# Patient Record
Sex: FEMALE | Race: WHITE | NOT HISPANIC OR LATINO | Employment: FULL TIME | URBAN - METROPOLITAN AREA
[De-identification: names, ages, dates, MRNs, and addresses within clinical notes are randomized per-mention and may not be internally consistent; named-entity substitution may affect disease eponyms.]

---

## 2021-11-16 ENCOUNTER — TELEPHONE (OUTPATIENT)
Dept: SCHEDULING | Facility: IMAGING CENTER | Age: 26
End: 2021-11-16

## 2021-11-17 ENCOUNTER — OFFICE VISIT (OUTPATIENT)
Dept: MEDICAL GROUP | Facility: CLINIC | Age: 26
End: 2021-11-17
Payer: COMMERCIAL

## 2021-11-17 VITALS
SYSTOLIC BLOOD PRESSURE: 110 MMHG | WEIGHT: 153 LBS | TEMPERATURE: 97.1 F | HEIGHT: 62 IN | OXYGEN SATURATION: 98 % | HEART RATE: 91 BPM | RESPIRATION RATE: 16 BRPM | DIASTOLIC BLOOD PRESSURE: 70 MMHG | BODY MASS INDEX: 28.16 KG/M2

## 2021-11-17 DIAGNOSIS — Z86.2 HISTORY OF ANEMIA: ICD-10-CM

## 2021-11-17 DIAGNOSIS — E55.9 VITAMIN D DEFICIENCY: ICD-10-CM

## 2021-11-17 DIAGNOSIS — Z96.41 PRESENCE OF INSULIN PUMP: ICD-10-CM

## 2021-11-17 DIAGNOSIS — E10.3293 TYPE 1 DIABETES MELLITUS WITH MILD NONPROLIFERATIVE DIABETIC RETINOPATHY WITHOUT MACULAR EDEMA, BILATERAL (HCC): ICD-10-CM

## 2021-11-17 PROBLEM — F33.9 MAJOR DEPRESSIVE DISORDER, RECURRENT EPISODE (HCC): Status: ACTIVE | Noted: 2020-03-25

## 2021-11-17 PROBLEM — G43.909 MIGRAINE: Status: ACTIVE | Noted: 2019-09-22

## 2021-11-17 PROBLEM — M79.89 SWELLING OF BOTH LOWER EXTREMITIES: Status: RESOLVED | Noted: 2019-12-22 | Resolved: 2021-11-17

## 2021-11-17 PROBLEM — N39.0 RECURRENT UTI: Status: ACTIVE | Noted: 2017-07-26

## 2021-11-17 PROBLEM — E86.0 DEHYDRATION: Status: ACTIVE | Noted: 2021-02-12

## 2021-11-17 PROBLEM — N13.30 HYDRONEPHROSIS: Status: ACTIVE | Noted: 2017-09-15

## 2021-11-17 PROBLEM — E86.0 DEHYDRATION: Status: RESOLVED | Noted: 2021-02-12 | Resolved: 2021-11-17

## 2021-11-17 PROBLEM — N39.0 RECURRENT UTI: Status: RESOLVED | Noted: 2017-07-26 | Resolved: 2021-11-17

## 2021-11-17 PROBLEM — R11.2 NAUSEA AND VOMITING: Status: ACTIVE | Noted: 2021-02-12

## 2021-11-17 PROBLEM — M79.89 SWELLING OF BOTH LOWER EXTREMITIES: Status: ACTIVE | Noted: 2019-12-22

## 2021-11-17 PROCEDURE — 99204 OFFICE O/P NEW MOD 45 MIN: CPT | Performed by: PHYSICIAN ASSISTANT

## 2021-11-17 RX ORDER — PROCHLORPERAZINE 25 MG/1
1 SUPPOSITORY RECTAL
Qty: 1 EACH | Refills: 3 | Status: SHIPPED | OUTPATIENT
Start: 2021-11-17 | End: 2021-12-23 | Stop reason: CLARIF

## 2021-11-17 RX ORDER — ONDANSETRON HYDROCHLORIDE 8 MG/1
1 TABLET, FILM COATED ORAL EVERY 8 HOURS PRN
COMMUNITY
Start: 2021-02-12 | End: 2021-11-17

## 2021-11-17 RX ORDER — SUMATRIPTAN 100 MG/1
TABLET, FILM COATED ORAL
COMMUNITY
Start: 2021-05-12 | End: 2023-05-12

## 2021-11-17 RX ORDER — PROCHLORPERAZINE 25 MG/1
1 SUPPOSITORY RECTAL
Qty: 9 EACH | Refills: 3 | Status: SHIPPED | OUTPATIENT
Start: 2021-11-17 | End: 2021-12-23 | Stop reason: CLARIF

## 2021-11-17 RX ORDER — ONDANSETRON 4 MG/1
4 TABLET, ORALLY DISINTEGRATING ORAL
COMMUNITY
End: 2022-07-07

## 2021-11-17 ASSESSMENT — PATIENT HEALTH QUESTIONNAIRE - PHQ9
7. TROUBLE CONCENTRATING ON THINGS, SUCH AS READING THE NEWSPAPER OR WATCHING TELEVISION: NOT AT ALL
SUM OF ALL RESPONSES TO PHQ QUESTIONS 1-9: 9
1. LITTLE INTEREST OR PLEASURE IN DOING THINGS: SEVERAL DAYS
SUM OF ALL RESPONSES TO PHQ9 QUESTIONS 1 AND 2: 2
5. POOR APPETITE OR OVEREATING: 3 - NEARLY EVERY DAY
9. THOUGHTS THAT YOU WOULD BE BETTER OFF DEAD, OR OF HURTING YOURSELF: NOT AT ALL
3. TROUBLE FALLING OR STAYING ASLEEP OR SLEEPING TOO MUCH: NEARLY EVERY DAY
2. FEELING DOWN, DEPRESSED, IRRITABLE, OR HOPELESS: SEVERAL DAYS
SUM OF ALL RESPONSES TO PHQ QUESTIONS 1-9: 9
4. FEELING TIRED OR HAVING LITTLE ENERGY: SEVERAL DAYS
5. POOR APPETITE OR OVEREATING: NEARLY EVERY DAY
8. MOVING OR SPEAKING SO SLOWLY THAT OTHER PEOPLE COULD HAVE NOTICED. OR THE OPPOSITE, BEING SO FIGETY OR RESTLESS THAT YOU HAVE BEEN MOVING AROUND A LOT MORE THAN USUAL: NOT AT ALL
CLINICAL INTERPRETATION OF PHQ2 SCORE: 2
6. FEELING BAD ABOUT YOURSELF - OR THAT YOU ARE A FAILURE OR HAVE LET YOURSELF OR YOUR FAMILY DOWN: NOT AL ALL

## 2021-11-17 ASSESSMENT — VISUAL ACUITY: OU: 1

## 2021-11-17 ASSESSMENT — ENCOUNTER SYMPTOMS
NEUROLOGICAL NEGATIVE: 1
CARDIOVASCULAR NEGATIVE: 1
CONSTITUTIONAL NEGATIVE: 1
GASTROINTESTINAL NEGATIVE: 1
MUSCULOSKELETAL NEGATIVE: 1
RESPIRATORY NEGATIVE: 1
PSYCHIATRIC NEGATIVE: 1
EYES NEGATIVE: 1

## 2021-11-17 NOTE — PROGRESS NOTES
Subjective   Yesi Gloria is a 26 y.o. female who presents with Establish Care and Referral Needed (endo- type 1 diabetic. )    1. 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  Currently on Humalog via tandem insulin pump.  New to the area and needs to establish with a local endocrinologist to help manage diabetes and insulin pump.  She is due for routine labs.  Referral placed today.  Request refills of her Dexcom G6 sensors and transmitter.  We will follow-up in 2 weeks with results.    Monofilament testing with a 10 gram force: sensation intact: intact bilaterally  Visual Inspection: Feet without maceration, ulcers, fissures.  Pedal pulses: intact bilaterally     Referral to Endocrinology   HEMOGLOBIN A1C; Future   MICROALBUMIN CREAT RATIO URINE; Future   Diabetic Monofilament LE Exam   Comp Metabolic Panel; Future   ESTIMATED GFR; Future   Lipid Profile; Future   Continuous Blood Gluc Sensor (DEXCOM G6 SENSOR) Misc; 1 Each every 10 days.  Dispense: 9 Each; Refill: 3   Continuous Blood Gluc Transmit (DEXCOM G6 TRANSMITTER) Misc; 1 Each every 3 months.  Dispense: 1 Each; Refill: 3    2. Presence of insulin pump  Patient has a tandem insulin pump.  She refills and manages her pump herself.  Does need a referral to endocrinology so that she has one locally.  She states that she will need insulin pump cartridges in the near future but not today.   Referral to Endocrinology   HEMOGLOBIN A1C; Future    3. History of anemia  Patient has a history of iron deficiency anemia.  She has not had labs done in over a year.  We will follow-up with results.   CBC WITH DIFFERENTIAL; Future   IRON/TOTAL IRON BIND; Future    4. Vitamin D deficiency  Chronic condition.  Not currently on supplementation.  Due for routine labs.   VITAMIN D,25 HYDROXY; Future    Past Medical History:  No date: Allergy      Comment:  IV contrast,  seasonal  No date: Anemia  No date: Anxiety  No date: Asthma  2/12/2021: Dehydration  No date: Depression  No date: Diabetes (HCC)  3/2/2016: Exercise-induced bronchospasm  No date: IBD (inflammatory bowel disease)      Comment:  Chronic constipation  No date: Kidney disease  No date: Migraine  7/26/2017: Recurrent UTI      Comment:  Formatting of this note might be different from the                original. Given history of recurrent UTI, including                pyelonephritis and especially high risk in pregnancy,                 Plan: Will start cephalexin 500 mg PO QD for PPx for                duration of pregnancy  No date: Retinopathy due to secondary DM ([facility])  12/22/2019: Swelling of both lower extremities  No date: Urinary tract infection  Past Surgical History:  No date: BARTHOLIN GLAND EXCISION  No date: PILONIDAL CYST EXCISION  No date: TUBAL COAGULATION LAPAROSCOPIC BILATERAL  Social History    Tobacco Use      Smoking status: Never Smoker      Smokeless tobacco: Never Used    Vaping Use      Vaping Use: Never used    Alcohol use: Yes      Alcohol/week: 1.2 oz      Types: 2 Standard drinks or equivalent per week      Comment: rarely    Drug use: Never    Review of patient's family history indicates:  Problem: Diabetes      Relation: Mother          Age of Onset: (Not Specified)  Problem: Obesity      Relation: Mother          Age of Onset: (Not Specified)  Problem: Hyperlipidemia      Relation: Mother          Age of Onset: (Not Specified)  Problem: Diabetes      Relation: Father          Age of Onset: (Not Specified)  Problem: Hyperlipidemia      Relation: Father          Age of Onset: (Not Specified)  Problem: Obesity      Relation: Father          Age of Onset: (Not Specified)  Problem: Diabetes      Relation: Brother          Age of Onset: (Not Specified)          Comment: Type 1  Problem: Lung Disease      Relation: Brother          Age of Onset: (Not Specified)  Problem: Alcohol abuse       Relation: Maternal Aunt          Age of Onset: (Not Specified)  Problem: Diabetes      Relation: Maternal Grandmother          Age of Onset: (Not Specified)  Problem: Kidney Disease      Relation: Maternal Grandmother          Age of Onset: (Not Specified)  Problem: Diabetes      Relation: Paternal Grandfather          Age of Onset: (Not Specified)          Comment: Type 1  Problem: Hyperlipidemia      Relation: Paternal Grandfather          Age of Onset: (Not Specified)  Problem: Breast Cancer      Relation: Other          Age of Onset: (Not Specified)      Current Outpatient Medications: •  insulin lispro (HUMALOG) 100 UNIT/ML, Inject subcutaneously as directed to control blood glucose, Disp: , Rfl: •  ondansetron (ZOFRAN ODT) 4 MG TABLET DISPERSIBLE, Place 4 mg under the tongue., Disp: , Rfl: •  sumatriptan (IMITREX) 100 MG tablet, Take 1 tablet by mouth at onset of migraine headache. May repeat after 2 hours if migraine is not relieved. Do not exceed 2 tablets in 24 hours, Disp: , Rfl: •  Continuous Blood Gluc Sensor (DEXCOM G6 SENSOR) Misc, 1 Each every 10 days., Disp: 9 Each, Rfl: 3•  Continuous Blood Gluc Transmit (DEXCOM G6 TRANSMITTER) Misc, 1 Each every 3 months., Disp: 1 Each, Rfl: 3    Patient was instructed on the use of medications, either prescriptions or OTC and informed on when the appropriate follow up time period should be. In addition, patient was also instructed that should any acute worsening occur that they should notify this clinic asap or call 911.      Review of Systems   Constitutional: Negative.    HENT: Negative.    Eyes: Negative.    Respiratory: Negative.    Cardiovascular: Negative.    Gastrointestinal: Negative.    Genitourinary: Negative.    Musculoskeletal: Negative.    Skin: Negative.    Neurological: Negative.    Endo/Heme/Allergies: Negative.    Psychiatric/Behavioral: Negative.      Objective     /70 (BP Location: Left arm, Patient Position: Sitting, BP Cuff Size: Adult)   " Pulse 91   Temp 36.2 °C (97.1 °F) (Temporal)   Resp 16   Ht 1.575 m (5' 2\") Comment: stated by pt  Wt 69.4 kg (153 lb) Comment: with shoes on  LMP 10/27/2021 (Approximate)   SpO2 98%   BMI 27.98 kg/m²      Physical Exam  Vitals and nursing note reviewed.   Constitutional:       Appearance: Normal appearance. She is well-developed and well-groomed.   HENT:      Head: Normocephalic and atraumatic.      Nose: Nose normal.      Mouth/Throat:      Lips: Pink. No lesions.      Mouth: Mucous membranes are moist.   Eyes:      General: Lids are normal. Vision grossly intact. Gaze aligned appropriately.      Extraocular Movements: Extraocular movements intact.      Conjunctiva/sclera: Conjunctivae normal.      Pupils: Pupils are equal, round, and reactive to light.   Neck:      Thyroid: No thyromegaly.      Vascular: No carotid bruit or JVD.      Trachea: Trachea and phonation normal.   Cardiovascular:      Rate and Rhythm: Normal rate and regular rhythm.      Pulses:           Dorsalis pedis pulses are 1+ on the right side and 1+ on the left side.        Posterior tibial pulses are 1+ on the right side and 1+ on the left side.      Heart sounds: Normal heart sounds. No murmur heard.  No friction rub. No gallop.    Pulmonary:      Effort: Pulmonary effort is normal.      Breath sounds: Normal breath sounds. No wheezing, rhonchi or rales.   Musculoskeletal:         General: Normal range of motion.      Cervical back: Normal range of motion and neck supple.      Right lower leg: No edema.      Left lower leg: No edema.      Right foot: Normal range of motion. No deformity or bunion.      Left foot: Normal range of motion. No deformity or bunion.   Feet:      Right foot:      Protective Sensation: 10 sites tested. 10 sites sensed.      Skin integrity: Skin integrity normal.      Toenail Condition: Right toenails are normal.      Left foot:      Protective Sensation: 10 sites tested. 10 sites sensed.      Skin integrity: " Skin integrity normal.      Toenail Condition: Left toenails are normal.   Lymphadenopathy:      Cervical: No cervical adenopathy.   Skin:     General: Skin is warm and dry.      Capillary Refill: Capillary refill takes less than 2 seconds.      Findings: No lesion or rash.   Neurological:      Mental Status: She is alert and oriented to person, place, and time.      Cranial Nerves: Cranial nerves are intact.   Psychiatric:         Attention and Perception: Attention and perception normal.         Mood and Affect: Mood and affect normal.         Speech: Speech normal.         Behavior: Behavior normal. Behavior is cooperative.         Thought Content: Thought content normal.         Judgment: Judgment normal.       Assessment & Plan      1. 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  Dispense: 9 Each; Refill: 3  - Continuous Blood Gluc Transmit (DEXCOM G6 TRANSMITTER) Misc; 1 Each every 3 months.  Dispense: 1 Each; Refill: 3    2. Presence of insulin pump  - Referral to Endocrinology  - HEMOGLOBIN A1C; Future    3. History of anemia  - CBC WITH DIFFERENTIAL; Future  - IRON/TOTAL IRON BIND; Future    4. Vitamin D deficiency  - VITAMIN D,25 HYDROXY; Future

## 2021-11-22 ENCOUNTER — TELEPHONE (OUTPATIENT)
Dept: ENDOCRINOLOGY | Facility: MEDICAL CENTER | Age: 26
End: 2021-11-22

## 2021-11-29 ENCOUNTER — HOSPITAL ENCOUNTER (OUTPATIENT)
Dept: LAB | Facility: MEDICAL CENTER | Age: 26
End: 2021-11-29
Attending: PHYSICIAN ASSISTANT
Payer: COMMERCIAL

## 2021-11-29 DIAGNOSIS — Z96.41 PRESENCE OF INSULIN PUMP: ICD-10-CM

## 2021-11-29 DIAGNOSIS — Z86.2 HISTORY OF ANEMIA: ICD-10-CM

## 2021-11-29 DIAGNOSIS — E10.3293 TYPE 1 DIABETES MELLITUS WITH MILD NONPROLIFERATIVE DIABETIC RETINOPATHY WITHOUT MACULAR EDEMA, BILATERAL (HCC): ICD-10-CM

## 2021-11-29 DIAGNOSIS — E55.9 VITAMIN D DEFICIENCY: ICD-10-CM

## 2021-11-29 LAB
ALBUMIN SERPL BCP-MCNC: 4.9 G/DL (ref 3.2–4.9)
ALBUMIN/GLOB SERPL: 1.6 G/DL
ALP SERPL-CCNC: 55 U/L (ref 30–99)
ALT SERPL-CCNC: 11 U/L (ref 2–50)
ANION GAP SERPL CALC-SCNC: 11 MMOL/L (ref 7–16)
AST SERPL-CCNC: 16 U/L (ref 12–45)
BASOPHILS # BLD AUTO: 1.6 % (ref 0–1.8)
BASOPHILS # BLD: 0.08 K/UL (ref 0–0.12)
BILIRUB SERPL-MCNC: 0.4 MG/DL (ref 0.1–1.5)
BUN SERPL-MCNC: 12 MG/DL (ref 8–22)
CALCIUM SERPL-MCNC: 9.3 MG/DL (ref 8.5–10.5)
CHLORIDE SERPL-SCNC: 103 MMOL/L (ref 96–112)
CHOLEST SERPL-MCNC: 165 MG/DL (ref 100–199)
CO2 SERPL-SCNC: 24 MMOL/L (ref 20–33)
CREAT SERPL-MCNC: 0.67 MG/DL (ref 0.5–1.4)
CREAT UR-MCNC: 123.03 MG/DL
EOSINOPHIL # BLD AUTO: 0.15 K/UL (ref 0–0.51)
EOSINOPHIL NFR BLD: 3.1 % (ref 0–6.9)
ERYTHROCYTE [DISTWIDTH] IN BLOOD BY AUTOMATED COUNT: 42.4 FL (ref 35.9–50)
EST. AVERAGE GLUCOSE BLD GHB EST-MCNC: 194 MG/DL
FASTING STATUS PATIENT QL REPORTED: NORMAL
GLOBULIN SER CALC-MCNC: 3 G/DL (ref 1.9–3.5)
GLUCOSE SERPL-MCNC: 147 MG/DL (ref 65–99)
HBA1C MFR BLD: 8.4 % (ref 4–5.6)
HCT VFR BLD AUTO: 41.5 % (ref 37–47)
HDLC SERPL-MCNC: 69 MG/DL
HGB BLD-MCNC: 13.4 G/DL (ref 12–16)
IMM GRANULOCYTES # BLD AUTO: 0.01 K/UL (ref 0–0.11)
IMM GRANULOCYTES NFR BLD AUTO: 0.2 % (ref 0–0.9)
IRON SATN MFR SERPL: 12 % (ref 15–55)
IRON SERPL-MCNC: 43 UG/DL (ref 40–170)
LDLC SERPL CALC-MCNC: 89 MG/DL
LYMPHOCYTES # BLD AUTO: 1.93 K/UL (ref 1–4.8)
LYMPHOCYTES NFR BLD: 39.3 % (ref 22–41)
MCH RBC QN AUTO: 27.7 PG (ref 27–33)
MCHC RBC AUTO-ENTMCNC: 32.3 G/DL (ref 33.6–35)
MCV RBC AUTO: 85.7 FL (ref 81.4–97.8)
MICROALBUMIN UR-MCNC: <1.2 MG/DL
MICROALBUMIN/CREAT UR: NORMAL MG/G (ref 0–30)
MONOCYTES # BLD AUTO: 0.27 K/UL (ref 0–0.85)
MONOCYTES NFR BLD AUTO: 5.5 % (ref 0–13.4)
NEUTROPHILS # BLD AUTO: 2.47 K/UL (ref 2–7.15)
NEUTROPHILS NFR BLD: 50.3 % (ref 44–72)
NRBC # BLD AUTO: 0 K/UL
NRBC BLD-RTO: 0 /100 WBC
PLATELET # BLD AUTO: 459 K/UL (ref 164–446)
PMV BLD AUTO: 10.9 FL (ref 9–12.9)
POTASSIUM SERPL-SCNC: 4.4 MMOL/L (ref 3.6–5.5)
PROT SERPL-MCNC: 7.9 G/DL (ref 6–8.2)
RBC # BLD AUTO: 4.84 M/UL (ref 4.2–5.4)
SODIUM SERPL-SCNC: 138 MMOL/L (ref 135–145)
TIBC SERPL-MCNC: 372 UG/DL (ref 250–450)
TRIGL SERPL-MCNC: 36 MG/DL (ref 0–149)
UIBC SERPL-MCNC: 329 UG/DL (ref 110–370)
WBC # BLD AUTO: 4.9 K/UL (ref 4.8–10.8)

## 2021-11-29 PROCEDURE — 83036 HEMOGLOBIN GLYCOSYLATED A1C: CPT

## 2021-11-29 PROCEDURE — 83540 ASSAY OF IRON: CPT

## 2021-11-29 PROCEDURE — 80061 LIPID PANEL: CPT

## 2021-11-29 PROCEDURE — 83550 IRON BINDING TEST: CPT

## 2021-11-29 PROCEDURE — 82043 UR ALBUMIN QUANTITATIVE: CPT

## 2021-11-29 PROCEDURE — 82306 VITAMIN D 25 HYDROXY: CPT

## 2021-11-29 PROCEDURE — 36415 COLL VENOUS BLD VENIPUNCTURE: CPT

## 2021-11-29 PROCEDURE — 80053 COMPREHEN METABOLIC PANEL: CPT

## 2021-11-29 PROCEDURE — 82570 ASSAY OF URINE CREATININE: CPT

## 2021-11-29 PROCEDURE — 85025 COMPLETE CBC W/AUTO DIFF WBC: CPT

## 2021-12-01 LAB — 25(OH)D3 SERPL-MCNC: 23 NG/ML (ref 30–80)

## 2021-12-13 ENCOUNTER — PATIENT MESSAGE (OUTPATIENT)
Dept: MEDICAL GROUP | Facility: CLINIC | Age: 26
End: 2021-12-13

## 2021-12-13 NOTE — PATIENT COMMUNICATION
VOICEMAIL  1. Caller Name: Yesi Gloria                        Call Back Number: [phone] (home)       2. Message: pt LVM wanting to know if the Dexcom that was ordered had a prior auth sent in.     3. Patient approves office to leave a detailed voicemail/MyChart message: N\A

## 2021-12-22 DIAGNOSIS — E10.3293 TYPE 1 DIABETES MELLITUS WITH MILD NONPROLIFERATIVE DIABETIC RETINOPATHY WITHOUT MACULAR EDEMA, BILATERAL (HCC): ICD-10-CM

## 2021-12-22 RX ORDER — LANCETS 30 GAUGE
EACH MISCELLANEOUS
Qty: 400 EACH | Refills: 3 | Status: SHIPPED | OUTPATIENT
Start: 2021-12-22 | End: 2021-12-22 | Stop reason: SDUPTHER

## 2021-12-22 RX ORDER — LANCETS 30 GAUGE
EACH MISCELLANEOUS
Qty: 400 EACH | Refills: 3 | Status: SHIPPED | OUTPATIENT
Start: 2021-12-22 | End: 2022-01-18 | Stop reason: SDUPTHER

## 2021-12-23 ENCOUNTER — TELEMEDICINE (OUTPATIENT)
Dept: MEDICAL GROUP | Facility: CLINIC | Age: 26
End: 2021-12-23
Payer: COMMERCIAL

## 2021-12-23 VITALS — HEIGHT: 62 IN | WEIGHT: 130 LBS | BODY MASS INDEX: 23.92 KG/M2

## 2021-12-23 DIAGNOSIS — E61.1 LOW IRON: ICD-10-CM

## 2021-12-23 DIAGNOSIS — E10.3293 TYPE 1 DIABETES MELLITUS WITH MILD NONPROLIFERATIVE DIABETIC RETINOPATHY WITHOUT MACULAR EDEMA, BILATERAL (HCC): ICD-10-CM

## 2021-12-23 DIAGNOSIS — E55.9 VITAMIN D DEFICIENCY: ICD-10-CM

## 2021-12-23 PROCEDURE — 99214 OFFICE O/P EST MOD 30 MIN: CPT | Mod: 95,CR | Performed by: PHYSICIAN ASSISTANT

## 2021-12-23 RX ORDER — INSULIN LISPRO 100 [IU]/ML
INJECTION, SUSPENSION SUBCUTANEOUS
COMMUNITY
End: 2022-01-26 | Stop reason: CLARIF

## 2021-12-23 ASSESSMENT — FIBROSIS 4 INDEX: FIB4 SCORE: 0.27

## 2021-12-31 PROBLEM — E55.9 VITAMIN D DEFICIENCY: Status: ACTIVE | Noted: 2021-12-31

## 2021-12-31 PROBLEM — E61.1 LOW IRON: Status: ACTIVE | Noted: 2021-12-31

## 2021-12-31 RX ORDER — TRIAMCINOLONE ACETONIDE 0.1 %
PASTE (GRAM) DENTAL
COMMUNITY
Start: 2021-12-11 | End: 2022-03-02

## 2022-01-01 NOTE — ASSESSMENT & PLAN NOTE
Patients most recent vitamin D level is 23. She has been instructed to start taking vitamin D3 2000 units daily. We will recheck labs in one year.

## 2022-01-01 NOTE — PROGRESS NOTES
Telemedicine Visit: Established Patient     This visit was conducted via Zoom using secure and encrypted videoconferencing technology. The patient was in a private location in the state of [place].    The patient's identity was confirmed and verbal consent was obtained for this virtual visit.    Subjective:   CC:   Chief Complaint   Patient presents with   • Lab Results     FV on lab work      Yesi Gloria is a 26 y.o. 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 Has had a Dexcom G6 for a few years. Recently moved to the area and has new insurance. Her current insurance will not cover her Dexcom supplies and may not cover her current insulin pump. Patient needs a new manual blood glucose meter to help her adjust her insulin. She has gone back to manual dosing of her insulin until she can be seen by endocrinology on 01/26/22. She will inform the clinic if she is in need of additional supplies as she transitions off of the insulin pump. Most recent A1c is 8.4%.    Vitamin D deficiency  Patients most recent vitamin D level is 23. She has been instructed to start taking vitamin D3 2000 units daily. We will recheck labs in one year.    Low iron  Patients most recent iron levels are very low normal or low. Current iron saturation is 12%. Recommend a multivitamin with extra iron to be taken daily to help supplement the iron that she gets from her diet.      ROS   Denies any recent fevers or chills. No nausea or vomiting. No chest pains or shortness of breath.     Allergies   Allergen Reactions   • Iodine-131 Nausea   • Iodine      Vomiting       Current medicines (including changes today)  Current Outpatient Medications   Medication Sig Dispense Refill   • Insulin Lispro Prot & Lispro (HUMALOG MIX 50/50 KWIKPEN) (50-50) 100 UNIT/ML Suspension Pen-injector Inject  under  the skin.     • Blood Glucose Test Strips Use one Freestyle James 2 strip to test blood sugar four times daily before meals and at bedtime. 400 Strip 3   • Lancets Use one Freestyle James 2 lancet to test blood sugar four times a day, before meals and at bedtime. 400 Each 3   • Blood Glucose Meter Kit Test blood sugar as recommended by provider. Freestyle James 2 blood glucose monitoring kit. 1 Kit 0   • insulin lispro (HUMALOG) 100 UNIT/ML Inject subcutaneously as directed to control blood glucose     • ondansetron (ZOFRAN ODT) 4 MG TABLET DISPERSIBLE Place 4 mg under the tongue.     • sumatriptan (IMITREX) 100 MG tablet Take 1 tablet by mouth at onset of migraine headache. May repeat after 2 hours if migraine is not relieved. Do not exceed 2 tablets in 24 hours       No current facility-administered medications for this visit.       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, bilateral (HCC) 11/24/2020   • Major depressive disorder, recurrent episode (HCC) 03/25/2020   • Migraine 09/22/2019   • Hydronephrosis 09/15/2017   • Hx of sepsis 11/20/2015   • History of methicillin resistant Staphylococcus aureus infection 11/18/2015   • Presence of insulin pump 02/20/2014       Family History   Problem Relation Age of Onset   • Diabetes Mother    • Obesity Mother    • Hyperlipidemia Mother    • Diabetes Father    • Hyperlipidemia Father    • Obesity Father    • Diabetes Brother         Type 1   • Lung Disease Brother    • Alcohol abuse Maternal Aunt    • Diabetes Maternal Grandmother    • Kidney Disease Maternal Grandmother    • Diabetes Paternal Grandfather         Type 1   • Hyperlipidemia Paternal Grandfather    • Breast Cancer Other        She  has a past medical history of Allergy, Anemia, Anxiety, Asthma,  "Dehydration (2/12/2021), Depression, Diabetes (HCC), Exercise-induced bronchospasm (3/2/2016), IBD (inflammatory bowel disease), Kidney disease, Migraine, Recurrent UTI (7/26/2017), Retinopathy due to secondary DM (HCC), Swelling of both lower extremities (12/22/2019), and Urinary tract infection. She also has no past medical history of Addisons disease ([facility]), Adrenal disorder ([facility]), Arrhythmia, Arthritis, Blood transfusion without reported diagnosis, Cancer ([facility]), Cataract, CHF (congestive heart failure) ([facility]), Clotting disorder (HCC), COPD (chronic obstructive pulmonary disease) ([facility]), Cushings syndrome ([facility]), Diabetic neuropathy ([facility]), Glaucoma, Goiter, Head ache, Heart attack (HCC), Heart murmur, HIV (human immunodeficiency virus infection) ([facility]), Hyperlipidemia, Hypertension, Meningitis, Osteoporosis, Parathyroid disorder (HCC), Pituitary disease (HCC), Pulmonary emphysema (HCC), Seizure ([facility]), Sickle cell disease ([facility]), Stroke ([facility]), Substance abuse ([facility]), Thyroid disease, or Tuberculosis.  She  has a past surgical history that includes tubal coagulation laparoscopic bilateral; bartholin gland excision; and pilonidal cyst excision.       Objective:   Ht 1.575 m (5' 2\")   Wt 59 kg (130 lb)   BMI 23.78 kg/m²     Physical Exam:  Constitutional: Alert, no distress, well-groomed.  Skin: No rashes in visible areas.  Eye: Round. Conjunctiva clear, lids normal. No icterus.   ENMT: Lips pink without lesions, good dentition, moist mucous membranes. Phonation normal.  Neck: No masses, no thyromegaly. Moves freely without pain.  CV: Pulse as reported by patient  Respiratory: Unlabored respiratory effort, no cough or audible wheeze  Psych: Alert and oriented x3, normal affect and mood.       Assessment and Plan:   The following treatment plan was discussed:     1. 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 Low iron    3. Vitamin D " deficiency    Other orders  - Insulin Lispro Prot & Lispro (HUMALOG MIX 50/50 KWIKPEN) (50-50) 100 UNIT/ML Suspension Pen-injector; Inject  under the skin.      Follow-up: Return in about 3 months (around 3/23/2022) for f/u diabetes.

## 2022-01-01 NOTE — ASSESSMENT & PLAN NOTE
Patients most recent iron levels are very low normal or low. Current iron saturation is 12%. Recommend a multivitamin with extra iron to be taken daily to help supplement the iron that she gets from her diet.

## 2022-01-01 NOTE — ASSESSMENT & PLAN NOTE
Patient is on an insulin pump. Has had a Dexcom G6 for a few years. Recently moved to the area and has new insurance. Her current insurance will not cover her Dexcom supplies and may not cover her current insulin pump. Patient needs a new manual blood glucose meter to help her adjust her insulin. She has gone back to manual dosing of her insulin until she can be seen by endocrinology on 01/26/22. She will inform the clinic if she is in need of additional supplies as she transitions off of the insulin pump. Most recent A1c is 8.4%.

## 2022-01-18 DIAGNOSIS — E10.3293 TYPE 1 DIABETES MELLITUS WITH MILD NONPROLIFERATIVE DIABETIC RETINOPATHY WITHOUT MACULAR EDEMA, BILATERAL (HCC): ICD-10-CM

## 2022-01-18 RX ORDER — LANCETS 30 GAUGE
EACH MISCELLANEOUS
Qty: 400 EACH | Refills: 3 | Status: SHIPPED | OUTPATIENT
Start: 2022-01-18 | End: 2024-03-24

## 2022-01-19 NOTE — TELEPHONE ENCOUNTER
Resent order for blood glucose meter and supplies. Pharmacy told patient that they never received the order.

## 2022-01-19 NOTE — PROGRESS NOTES
Patient has sent multiple messages regarding her freestyle miguel monitor and supplies. These were ordered and sent to [facility] 12/22/2021.

## 2022-01-26 ENCOUNTER — OFFICE VISIT (OUTPATIENT)
Dept: ENDOCRINOLOGY | Facility: MEDICAL CENTER | Age: 27
End: 2022-01-26
Payer: COMMERCIAL

## 2022-01-26 VITALS
OXYGEN SATURATION: 100 % | HEIGHT: 62 IN | WEIGHT: 134 LBS | DIASTOLIC BLOOD PRESSURE: 70 MMHG | RESPIRATION RATE: 16 BRPM | SYSTOLIC BLOOD PRESSURE: 102 MMHG | BODY MASS INDEX: 24.66 KG/M2 | HEART RATE: 100 BPM

## 2022-01-26 DIAGNOSIS — E10.3293 TYPE 1 DIABETES MELLITUS WITH MILD NONPROLIFERATIVE DIABETIC RETINOPATHY WITHOUT MACULAR EDEMA, BILATERAL (HCC): ICD-10-CM

## 2022-01-26 DIAGNOSIS — E10.65 UNCONTROLLED TYPE 1 DIABETES MELLITUS WITH HYPERGLYCEMIA (HCC): ICD-10-CM

## 2022-01-26 DIAGNOSIS — E55.9 VITAMIN D DEFICIENCY: ICD-10-CM

## 2022-01-26 LAB
HBA1C MFR BLD: 7.7 % (ref 0–5.6)
INT CON NEG: ABNORMAL
INT CON POS: ABNORMAL

## 2022-01-26 PROCEDURE — 83036 HEMOGLOBIN GLYCOSYLATED A1C: CPT

## 2022-01-26 PROCEDURE — 99204 OFFICE O/P NEW MOD 45 MIN: CPT

## 2022-01-26 PROCEDURE — 99212 OFFICE O/P EST SF 10 MIN: CPT

## 2022-01-26 RX ORDER — INSULIN GLARGINE 100 [IU]/ML
25 INJECTION, SOLUTION SUBCUTANEOUS EVERY EVENING
COMMUNITY
End: 2022-01-26 | Stop reason: SDUPTHER

## 2022-01-26 RX ORDER — INSULIN GLARGINE 100 [IU]/ML
25 INJECTION, SOLUTION SUBCUTANEOUS EVERY EVENING
Qty: 15 ML | Refills: 1 | Status: SHIPPED | OUTPATIENT
Start: 2022-01-26 | End: 2022-04-06 | Stop reason: SDUPTHER

## 2022-01-26 ASSESSMENT — FIBROSIS 4 INDEX: FIB4 SCORE: 0.27

## 2022-01-26 NOTE — PROGRESS NOTES
Chief Complaint:  Consult requested by Kathleen Garay P.A.-C. for initial evaluation of Type 1 Diabetes Mellitus    HPI:   1. Uncontrolled type 1 diabetes mellitus with hyperglycemia:   Yesi Gloria is a 26 y.o. female with Type 1 Diabetes Mellitus diagnosed when she was 15.    She denies hospitalizations for DKA in the past.    She was seeing [facility] in [place]. She was moved to [place] in August of 2022.     A1C on 1/26/22  at 7.7%    She is currently taking:  Humalog quick pens 1:7 correction  Correction 1:50 > 200  Lantus 25 units nightly    She started James 2 days ago, using Dexcom prior.   Average BG usually 170. BG in the am 190 but normally she is at 101.  She got off the pump about 1 week ago and now she is having morning highs.   She had to get off the insulin pump because insurance is not covering her supplies, she was using Dexcom but insurance is not covering either so she is now using the james      She reports hypoglycemic episodes occurring when she got off the pump   She denies hypoglycemic unawareness.   She reports episodes of severe hypoglycemia requiring third party assistance a few years ago.    She  is wearing a medical alert bracelet or necklace.    She does have a glucagon emergency kit.    She denies attending diabetes education classes.  Diet: well balanced.    Diabetes Complications   She  denies history of retinopathy.    She denies laser eye surgery.   Last eye exam: March 2021. In [place].   She denies history of peripheral sensory neuropathy.    She denies numbness, tingling, weakness in both feet.    She denies history of foot sores.   She denies history of kidney damage.    She is not on ACE inhibitor or ARB.   She denies history of coronary artery disease.    She  denies history of stroke and denies TIA.    She denies history of PAD.  She denies history of hyperlipidemia.       Ref. Range 4/26/2021 14:01 11/29/2021 09:44 11/29/2021 09:45 1/26/2022 09:41    Glycohemoglobin Latest Ref Range: 0.0 - 5.6 % 9.2 (H) 8.4 (H)  7.7 (A)      Ref. Range 11/29/2021 09:44   Cholesterol,Tot Latest Ref Range: 100 - 199 mg/dL 165   Triglycerides Latest Ref Range: 0 - 149 mg/dL 36   HDL Latest Ref Range: >=40 mg/dL 69   LDL Latest Ref Range: <100 mg/dL 89      Ref. Range 11/29/2021 09:45   Micro Alb Creat Ratio Latest Ref Range: 0 - 30 mg/g see below   Creatinine, Urine Latest Units: mg/dL 123.03   Microalbumin, Urine Random Latest Units: mg/dL <1.2     2.  Vitamin D deficiency:  Currently taking 2000 IUs OTC    ROS:     CONS:     No fever, no chills, no weight loss, no fatigue   EYES:      No diplopia, no blurry vision, no redness of eyes, no swelling of eyelids   ENT:    No hearing loss, No ear pain, No sore throat, no dysphagia, no neck swelling   CV:     No chest pain, no palpitations, no claudication, no orthopnea, no PND   PULM:    No SOB, no cough, no hemoptysis, no wheezing    GI:   No nausea, no vomiting, no diarrhea, no constipation, no bloody stools   :  Passing urine well, no dysuria, no hematuria   ENDO:   No polyuria, no polydipsia, no heat intolerance, no cold intolerance   NEURO: No headaches, no dizziness, no convulsions, no tremors   MUSC:  No joint swellings, no arthralgias, no myalgias, no weakness   SKIN:   No rash, no ulcers, no dry skin   PSYCH:   No depression, no anxiety, no difficulty sleeping       Past Medical History:  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, bilateral (HCC) 11/24/2020   • Major depressive disorder, recurrent episode (HCC) 03/25/2020   • Migraine 09/22/2019   • Hydronephrosis 09/15/2017   • Hx of sepsis 11/20/2015   • History of methicillin resistant Staphylococcus aureus infection 11/18/2015   • Presence of insulin pump 02/20/2014        Past Surgical History:  Past Surgical History:   Procedure Laterality Date   • BARTHOLIN GLAND EXCISION     • PILONIDAL CYST EXCISION     • TUBAL COAGULATION LAPAROSCOPIC BILATERAL          Allergies:  Iodine-131 and Iodine     Current Medications:    Current Outpatient Medications:   •  insulin glargine (LANTUS SOLOSTAR) 100 UNIT/ML Solution Pen-injector injection, Inject 25 Units under the skin every evening., Disp: , Rfl:   •  ondansetron (ZOFRAN ODT) 4 MG TABLET DISPERSIBLE, Place 4 mg under the tongue., Disp: , Rfl:   •  sumatriptan (IMITREX) 100 MG tablet, Take 1 tablet by mouth at onset of migraine headache. May repeat after 2 hours if migraine is not relieved. Do not exceed 2 tablets in 24 hours, Disp: , Rfl:   •  Continuous Blood Gluc Sensor (FREESTYLE AIDE 2 SENSOR) Misc, , Disp: , Rfl:   •  Blood Glucose Meter Kit, Test blood sugar as recommended by provider. Freestyle Aide 2 blood glucose monitoring kit., Disp: 1 Kit, Rfl: 0  •  Blood Glucose Test Strips, Use one Freestyle Aide 2 strip to test blood sugar four times daily before meals and at bedtime., Disp: 400 Strip, Rfl: 3  •  Lancets, Use one Freestyle Aide 2 lancet to test blood sugar four times a day, before meals and at bedtime., Disp: 400 Each, Rfl: 3  •  triamcinolone acetonide (ORALONE) 0.1 % Paste, , Disp: , Rfl:   •  insulin lispro (HUMALOG) 100 UNIT/ML, Inject subcutaneously as directed to control blood glucose, Disp: , Rfl:     Social History:  Social History     Socioeconomic History   • Marital status:      Spouse name: Lee   • Number of children: 2   • Years of education: Not on file   • Highest education level: Some college, no degree   Occupational History   • Not on file   Tobacco Use   • Smoking status: Never Smoker   • Smokeless tobacco: Never Used   Vaping Use   • Vaping Use: Never used   Substance and Sexual Activity   • Alcohol use: Yes     Alcohol/week: 1.2 oz     Types: 2 Standard drinks or equivalent per week      Comment: rarely   • Drug use: Never   • Sexual activity: Yes     Partners: Male     Birth control/protection: Female Sterilization   Other Topics Concern   • Not on file   Social History Narrative   • Not on file     Social Determinants of Health     Financial Resource Strain:    • Difficulty of Paying Living Expenses: Not on file   Food Insecurity:    • Worried About Running Out of Food in the Last Year: Not on file   • Ran Out of Food in the Last Year: Not on file   Transportation Needs:    • Lack of Transportation (Medical): Not on file   • Lack of Transportation (Non-Medical): Not on file   Physical Activity:    • Days of Exercise per Week: Not on file   • Minutes of Exercise per Session: Not on file   Stress:    • Feeling of Stress : Not on file   Social Connections:    • Frequency of Communication with Friends and Family: Not on file   • Frequency of Social Gatherings with Friends and Family: Not on file   • Attends Bahai Services: Not on file   • Active Member of Clubs or Organizations: Not on file   • Attends Club or Organization Meetings: Not on file   • Marital Status: Not on file   Intimate Partner Violence:    • Fear of Current or Ex-Partner: Not on file   • Emotionally Abused: Not on file   • Physically Abused: Not on file   • Sexually Abused: Not on file   Housing Stability:    • Unable to Pay for Housing in the Last Year: Not on file   • Number of Places Lived in the Last Year: Not on file   • Unstable Housing in the Last Year: Not on file        Family History:   Family History   Problem Relation Age of Onset   • Diabetes Mother    • Obesity Mother    • Hyperlipidemia Mother    • Diabetes Father    • Hyperlipidemia Father    • Obesity Father    • Diabetes Brother         Type 1   • Lung Disease Brother    • Alcohol abuse Maternal Aunt    • Diabetes Maternal Grandmother    • Kidney Disease Maternal Grandmother    • Diabetes Paternal Grandfather         Type 1   • Hyperlipidemia Paternal  "Grandfather    • Breast Cancer Other          PHYSICAL EXAM:   Vital signs: /70 (BP Location: Left arm, Patient Position: Sitting, BP Cuff Size: Adult)   Pulse 100   Resp 16   Ht 1.575 m (5' 2\")   Wt 60.8 kg (134 lb)   SpO2 100%   BMI 24.51 kg/m²   GENERAL: Well-developed, well-nourished  in no apparent distress.   EYE: No ocular and eyelid asymmetry, Anicteric sclerae,  PERRL, No exophthalmos or lidlag  HENT: Hearing grossly intact, Normocephalic, atraumatic. Pink, moist mucous membranes, No exudate  NECK: Supple. Trachea midline. thyroid is normal in size without nodules or tenderness  CARDIOVASCULAR: Regular rate and rhythm. No murmurs, rubs, or gallops.   LUNGS: Clear to auscultation bilaterally   ABDOMEN: Soft, nontender with positive bowel sounds.   EXTREMITIES: No clubbing, cyanosis, or edema.   NEUROLOGICAL: Cranial nerves II-XII are grossly intact   Symmetric reflexes at the patella no proximal muscle weakness, No visible tremor with both outstretched hands  LYMPH: No cervical, supraclavicular,  adenopathy palpated.   SKIN: No rashes, lesions. Turgor is normal.  FOOT: Normal sensation to monofilament testing, normal pulses, no ulcers.  Normal Vibration quantitative sensation test.    Labs:  Lab Results   Component Value Date/Time    HBA1C 7.7 (A) 01/26/2022 0941    AVGLUC 194 11/29/2021 0944       Lab Results   Component Value Date/Time    WBC 4.9 11/29/2021 09:44 AM    RBC 4.84 11/29/2021 09:44 AM    HEMOGLOBIN 13.4 11/29/2021 09:44 AM    MCV 85.7 11/29/2021 09:44 AM    MCH 27.7 11/29/2021 09:44 AM    MCHC 32.3 (L) 11/29/2021 09:44 AM    RDW 42.4 11/29/2021 09:44 AM    MPV 10.9 11/29/2021 09:44 AM       Lab Results   Component Value Date/Time    SODIUM 138 11/29/2021 09:44 AM    POTASSIUM 4.4 11/29/2021 09:44 AM    CHLORIDE 103 11/29/2021 09:44 AM    CO2 24 11/29/2021 09:44 AM    ANION 11.0 11/29/2021 09:44 AM    GLUCOSE 147 (H) 11/29/2021 09:44 AM    BUN 12 11/29/2021 09:44 AM    CREATININE " 0.67 11/29/2021 09:44 AM    CALCIUM 9.3 11/29/2021 09:44 AM    ASTSGOT 16 11/29/2021 09:44 AM    ALTSGPT 11 11/29/2021 09:44 AM    TBILIRUBIN 0.4 11/29/2021 09:44 AM    ALBUMIN 4.9 11/29/2021 09:44 AM    TOTPROTEIN 7.9 11/29/2021 09:44 AM    GLOBULIN 3.0 11/29/2021 09:44 AM    AGRATIO 1.6 11/29/2021 09:44 AM       Lab Results   Component Value Date/Time    CHOLSTRLTOT 165 11/29/2021 0944    TRIGLYCERIDE 36 11/29/2021 0944    HDL 69 11/29/2021 0944    LDL 89 11/29/2021 0944       Lab Results   Component Value Date/Time    MALBCRT see below 11/29/2021 09:45 AM    MICROALBUR <1.2 11/29/2021 09:45 AM            ASSESSMENT/PLAN:   1. Uncontrolled type 1 diabetes mellitus with hyperglycemia (HCC)  Uncontrolled  See HPI  We gave her some insulin pump supplies that will last for 30 days.  She is hoping that she is going to get on a new insurance plan and her insulin pump supplies will be covered as well as her Dexcom    A1c today of 7.7%   - POCT Hemoglobin A1C    Insulin pump supplies that we gave her will last for 30 days.  We discussed that if she has to go back to insulin to do the following settings  Humalog quick pens 1:7 correction  Correction 1:50 > 200  Lantus 25 units nightly    She needs to increase or decrease her Lantus by 2 units every 2 to 4 days.  If she is having low blood glucose levels in the mornings below 70 she decreases her Lantus by 2 units every 2 to 4 days, at the same time when she is waking up with blood sugars above 130 she will increase her Lantus by 2 units every 2 to 4 days.  She verbalized understanding    2. Vitamin D deficiency  Continue taking 2000 IUs OTC  I will evaluate levels    Disposition: Make an appointment with me in 4 weeks to evaluate pump management    Please do the following blood work 3 days before your next appointment with me  - Lipid Profile; Future  - MICROALBUMIN CREAT RATIO URINE; Future  - Comp Metabolic Panel; Future  - TSH; Future  - FREE THYROXINE; Future  -  VITAMIN D,25 HYDROXY; Future

## 2022-01-26 NOTE — PROGRESS NOTES
"RN-CDE Note    Subjective:     HPI:  Yesi Gloria is a 26 y.o. old patient who is seen by the Diabetes Nurse Specialist today for review of her type 1 diabetes, she is a new patient today.    She has had type 1 diabetes for about 15 years.  Was using a Tandem pump but ran out of supplies and changed insurance companies.    Changes in Health: none     Diabetes Medications:   Lantus 25 units per day  Novolog using at 1:7 cho ratio and 1:50 over 150 correction ratio  Taking above medications as prescribed: yes  Taking daily ASA: Not Indicated    Exercise: states active   Diet: \"healthy\" diet  in general  Patient's body mass index is 24.51 kg/m². Exercise and nutrition counseling were performed at this visit.      Health Maintenance:   Health Maintenance Due   Topic Date Due   • IMM HEP B VACCINE (3 of 3 - Risk 3-dose series) 07/08/1996   • COVID-19 Vaccine (1) Never done   • IMM HPV VACCINE (1 - 2-dose series) Never done   • RETINAL SCREENING  Never done   • PAP SMEAR  Never done   • IMM DTaP/Tdap/Td Vaccine (7 - Td or Tdap) 05/01/2019   • IMM INFLUENZA (1) 09/01/2021         DM:   Last A1c:   Lab Results   Component Value Date/Time    HBA1C 7.7 (A) 01/26/2022 09:41 AM      Previous A1c was 8.4  on 11/29/21  A1C GOAL: < 7    Glucose monitoring frequency: Just started using James CGM this week.  Will send a email to link with out account     Hypoglycemic episodes: yes - has been having some the past few nights.     Last Retinal Exam: states she had on through [facility] last year, denies any problems.   Daily Foot Exam: Yes     Exam:  Monofilament: current due 11/17/22    Lab Results   Component Value Date/Time    MALBCRT see below 11/29/2021 09:45 AM    MICROALBUR <1.2 11/29/2021 09:45 AM        ACR Albumin/Creatinine Ratio goal <30     HTN:   Blood pressure goal <140/<80 .   Currently Rx ACE/ARB: Not Indicated     Dyslipidemia:    Lab Results   Component Value Date/Time    CHOLSTRLTOT 165 11/29/2021 09:44 AM    LDL 89 " 11/29/2021 09:44 AM    HDL 69 11/29/2021 09:44 AM    TRIGLYCERIDE 36 11/29/2021 09:44 AM         Currently Rx Statin: Not Indicated     She  reports that she has never smoked. She has never used smokeless tobacco.      Plan:     Discussed and educated on:   - All medications, side effects and compliance (discussed carefully)  - Annual eye examinations at Ophthalmology  - HbA1C: target  - Weight control and daily exercise    Recommended medication changes: no changes, was given some infusion sets for pump and will go back on.

## 2022-03-02 ENCOUNTER — OFFICE VISIT (OUTPATIENT)
Dept: MEDICAL GROUP | Facility: CLINIC | Age: 27
End: 2022-03-02
Payer: COMMERCIAL

## 2022-03-02 VITALS
WEIGHT: 135.2 LBS | HEIGHT: 62 IN | HEART RATE: 98 BPM | TEMPERATURE: 97.5 F | DIASTOLIC BLOOD PRESSURE: 64 MMHG | OXYGEN SATURATION: 96 % | BODY MASS INDEX: 24.88 KG/M2 | SYSTOLIC BLOOD PRESSURE: 112 MMHG | RESPIRATION RATE: 20 BRPM

## 2022-03-02 DIAGNOSIS — F33.1 MODERATE EPISODE OF RECURRENT MAJOR DEPRESSIVE DISORDER (HCC): ICD-10-CM

## 2022-03-02 DIAGNOSIS — F41.1 GENERALIZED ANXIETY DISORDER: ICD-10-CM

## 2022-03-02 PROCEDURE — 99214 OFFICE O/P EST MOD 30 MIN: CPT | Performed by: PHYSICIAN ASSISTANT

## 2022-03-02 RX ORDER — DULOXETIN HYDROCHLORIDE 30 MG/1
30 CAPSULE, DELAYED RELEASE ORAL DAILY
Qty: 30 CAPSULE | Refills: 1 | Status: SHIPPED | OUTPATIENT
Start: 2022-03-02 | End: 2022-03-17 | Stop reason: SDUPTHER

## 2022-03-02 ASSESSMENT — ANXIETY QUESTIONNAIRES
7. FEELING AFRAID AS IF SOMETHING AWFUL MIGHT HAPPEN: NEARLY EVERY DAY
4. TROUBLE RELAXING: NEARLY EVERY DAY
6. BECOMING EASILY ANNOYED OR IRRITABLE: NEARLY EVERY DAY
1. FEELING NERVOUS, ANXIOUS, OR ON EDGE: NEARLY EVERY DAY
2. NOT BEING ABLE TO STOP OR CONTROL WORRYING: NEARLY EVERY DAY
5. BEING SO RESTLESS THAT IT IS HARD TO SIT STILL: MORE THAN HALF THE DAYS
IF YOU CHECKED OFF ANY PROBLEMS ON THIS QUESTIONNAIRE, HOW DIFFICULT HAVE THESE PROBLEMS MADE IT FOR YOU TO DO YOUR WORK, TAKE CARE OF THINGS AT HOME, OR GET ALONG WITH OTHER PEOPLE: EXTREMELY DIFFICULT
3. WORRYING TOO MUCH ABOUT DIFFERENT THINGS: NEARLY EVERY DAY
GAD7 TOTAL SCORE: 20

## 2022-03-02 ASSESSMENT — PATIENT HEALTH QUESTIONNAIRE - PHQ9
5. POOR APPETITE OR OVEREATING: 3 - NEARLY EVERY DAY
CLINICAL INTERPRETATION OF PHQ2 SCORE: 3
SUM OF ALL RESPONSES TO PHQ QUESTIONS 1-9: 16

## 2022-03-02 ASSESSMENT — FIBROSIS 4 INDEX: FIB4 SCORE: 0.27

## 2022-03-09 ASSESSMENT — ENCOUNTER SYMPTOMS
CARDIOVASCULAR NEGATIVE: 1
GASTROINTESTINAL NEGATIVE: 1
MUSCULOSKELETAL NEGATIVE: 1
NERVOUS/ANXIOUS: 1
HALLUCINATIONS: 0
MEMORY LOSS: 0
CONSTITUTIONAL NEGATIVE: 1
EYES NEGATIVE: 1
DEPRESSION: 1
INSOMNIA: 0
NEUROLOGICAL NEGATIVE: 1
RESPIRATORY NEGATIVE: 1

## 2022-03-09 ASSESSMENT — LIFESTYLE VARIABLES: SUBSTANCE_ABUSE: 0

## 2022-03-09 ASSESSMENT — VISUAL ACUITY: OU: 1

## 2022-03-09 NOTE — PROGRESS NOTES
Subjective   Yesi Gloria is a 26 y.o. female who presents with Anxiety (Has taken Zoloft and Prozac before with no result. Having a hard time controlling her anxiety. Been having panic attacks more often. )    1. Moderate episode of recurrent major depressive disorder (HCC)  This is a chronic health problem that is uncontrolled with lifestyle measures. Has taken different antidepressants in the past but they either did not work or she could not tolerate the side effects. PHQ-9 in office is 16 without thoughts of self harm. Will start her on duloxetine 30 mg and follow up in two weeks. If needed we can discuss dose adjustment at that time.   DULoxetine (CYMBALTA) 30 MG Cap DR Particles; Take 1 Capsule by mouth every day.  Dispense: 30 Capsule; Refill: 1   Patient has been identified as having a positive depression screening. Appropriate orders and counseling have been given.    2. Generalized anxiety disorder  Anxiety has been worse recently and she has started having panic attacks again. Declines referral to behavioral health at this time.   DULoxetine (CYMBALTA) 30 MG Cap DR Particles; Take 1 Capsule by mouth every day.  Dispense: 30 Capsule; Refill: 1    Past Medical History:  No date: Allergy      Comment:  IV contrast, seasonal  No date: Anemia  No date: Anxiety  No date: Asthma  2/12/2021: Dehydration  No date: Depression  No date: Diabetes ([facility])  3/2/2016: Exercise-induced bronchospasm  No date: IBD (inflammatory bowel disease)      Comment:  Chronic constipation  No date: Kidney disease  No date: Migraine  7/26/2017: Recurrent UTI      Comment:  Formatting of this note might be different from the                original. Given history of recurrent UTI, including                pyelonephritis and especially high risk in pregnancy,                 Plan: Will start cephalexin 500 mg PO QD for PPx for                duration of pregnancy  No date: Retinopathy due to secondary DM ([facility])  12/22/2019: Swelling of  both lower extremities  No date: Urinary tract infection  Past Surgical History:  No date: BARTHOLIN GLAND EXCISION  No date: PILONIDAL CYST EXCISION  No date: TUBAL COAGULATION LAPAROSCOPIC BILATERAL  Social History    Tobacco Use      Smoking status: Never Smoker      Smokeless tobacco: Never Used    Vaping Use      Vaping Use: Never used    Alcohol use: Yes      Alcohol/week: 1.2 oz      Types: 2 Standard drinks or equivalent per week      Comment: rarely    Drug use: Never    Review of patient's family history indicates:  Problem: Diabetes      Relation: Mother          Age of Onset: (Not Specified)  Problem: Obesity      Relation: Mother          Age of Onset: (Not Specified)  Problem: Hyperlipidemia      Relation: Mother          Age of Onset: (Not Specified)  Problem: Diabetes      Relation: Father          Age of Onset: (Not Specified)  Problem: Hyperlipidemia      Relation: Father          Age of Onset: (Not Specified)  Problem: Obesity      Relation: Father          Age of Onset: (Not Specified)  Problem: Diabetes      Relation: Brother          Age of Onset: (Not Specified)          Comment: Type 1  Problem: Lung Disease      Relation: Brother          Age of Onset: (Not Specified)  Problem: Alcohol abuse      Relation: Maternal Aunt          Age of Onset: (Not Specified)  Problem: Diabetes      Relation: Maternal Grandmother          Age of Onset: (Not Specified)  Problem: Kidney Disease      Relation: Maternal Grandmother          Age of Onset: (Not Specified)  Problem: Diabetes      Relation: Paternal Grandfather          Age of Onset: (Not Specified)          Comment: Type 1  Problem: Hyperlipidemia      Relation: Paternal Grandfather          Age of Onset: (Not Specified)  Problem: Breast Cancer      Relation: Other          Age of Onset: (Not Specified)      Current Outpatient Medications: •  DULoxetine (CYMBALTA) 30 MG Cap DR Particles, Take 1 Capsule by mouth every day., Disp: 30 Capsule, Rfl: 1•   Continuous Blood Gluc Sensor (FREESTYLE AIDE 2 SENSOR) [facility], , Disp: , Rfl: •  insulin glargine (LANTUS SOLOSTAR) 100 UNIT/ML Solution Pen-injector injection, Inject 25 Units under the skin every evening., Disp: 15 mL, Rfl: 1•  insulin lispro (HUMALOG) 100 UNIT/ML, Inject subcutaneously as directed to control blood glucose, Disp: 20 mL, Rfl: 1•  Blood Glucose Test Strips, Use one Freestyle Aide 2 strip to test blood sugar four times daily before meals and at bedtime., Disp: 400 Strip, Rfl: 3•  Blood Glucose Meter Kit, Test blood sugar as recommended by provider. Freestyle Aide 2 blood glucose monitoring kit., Disp: 1 Kit, Rfl: 0•  Lancets, Use one Freestyle Aide 2 lancet to test blood sugar four times a day, before meals and at bedtime., Disp: 400 Each, Rfl: 3•  ondansetron (ZOFRAN ODT) 4 MG TABLET DISPERSIBLE, Place 4 mg under the tongue., Disp: , Rfl: •  sumatriptan (IMITREX) 100 MG tablet, Take 1 tablet by mouth at onset of migraine headache. May repeat after 2 hours if migraine is not relieved. Do not exceed 2 tablets in 24 hours, Disp: , Rfl:     Patient was instructed on the use of medications, either prescriptions or OTC and informed on when the appropriate follow up time period should be. In addition, patient was also instructed that should any acute worsening occur that they should notify this clinic asap or call 911.      Review of Systems   Constitutional: Negative.    HENT: Negative.    Eyes: Negative.    Respiratory: Negative.    Cardiovascular: Negative.    Gastrointestinal: Negative.    Genitourinary: Negative.    Musculoskeletal: Negative.    Skin: Negative.    Neurological: Negative.    Endo/Heme/Allergies: Negative.    Psychiatric/Behavioral: Positive for depression. Negative for hallucinations, memory loss, substance abuse and suicidal ideas. The patient is nervous/anxious. The patient does not have insomnia.      Objective     /64 (BP Location: Left arm, Patient Position: Sitting, BP  "Cuff Size: Adult)   Pulse 98   Temp 36.4 °C (97.5 °F) (Temporal)   Resp 20   Ht 1.575 m (5' 2\") Comment: stated by pt  Wt 61.3 kg (135 lb 3.2 oz) Comment: with shoes on  SpO2 96%   BMI 24.73 kg/m²      Physical Exam  Vitals and nursing note reviewed.   Constitutional:       Appearance: Normal appearance. She is well-developed and well-groomed.   HENT:      Head: Normocephalic and atraumatic.      Nose: Nose normal.      Mouth/Throat:      Lips: Pink. No lesions.      Mouth: Mucous membranes are moist.   Eyes:      General: Lids are normal. Vision grossly intact. Gaze aligned appropriately.      Extraocular Movements: Extraocular movements intact.      Conjunctiva/sclera: Conjunctivae normal.      Pupils: Pupils are equal, round, and reactive to light.   Neck:      Thyroid: No thyromegaly.      Vascular: No carotid bruit or JVD.      Trachea: Trachea and phonation normal.   Cardiovascular:      Rate and Rhythm: Normal rate and regular rhythm.      Heart sounds: Normal heart sounds. No murmur heard.    No friction rub. No gallop.   Pulmonary:      Effort: Pulmonary effort is normal.      Breath sounds: Normal breath sounds. No wheezing, rhonchi or rales.   Musculoskeletal:         General: Normal range of motion.      Cervical back: Normal range of motion and neck supple.      Right lower leg: No edema.      Left lower leg: No edema.   Lymphadenopathy:      Cervical: No cervical adenopathy.   Skin:     General: Skin is warm and dry.      Capillary Refill: Capillary refill takes less than 2 seconds.      Findings: No lesion or rash.   Neurological:      Mental Status: She is alert and oriented to person, place, and time.      Cranial Nerves: Cranial nerves are intact.   Psychiatric:         Attention and Perception: Attention and perception normal.         Mood and Affect: Mood is anxious and depressed. Affect is tearful.         Speech: Speech normal.         Behavior: Behavior normal. Behavior is cooperative.  "        Thought Content: Thought content normal. Thought content is not paranoid or delusional. Thought content does not include homicidal or suicidal ideation. Thought content does not include homicidal or suicidal plan.         Judgment: Judgment normal.       Assessment & Plan      1. Moderate episode of recurrent major depressive disorder (HCC)  - DULoxetine (CYMBALTA) 30 MG Cap DR Particles; Take 1 Capsule by mouth every day.  Dispense: 30 Capsule; Refill: 1  - Patient has been identified as having a positive depression screening. Appropriate orders and counseling have been given.    2. Generalized anxiety disorder  - DULoxetine (CYMBALTA) 30 MG Cap DR Particles; Take 1 Capsule by mouth every day.  Dispense: 30 Capsule; Refill: 1

## 2022-03-17 ENCOUNTER — OFFICE VISIT (OUTPATIENT)
Dept: MEDICAL GROUP | Facility: CLINIC | Age: 27
End: 2022-03-17
Payer: COMMERCIAL

## 2022-03-17 VITALS
TEMPERATURE: 97.9 F | HEART RATE: 92 BPM | HEIGHT: 62 IN | WEIGHT: 133 LBS | SYSTOLIC BLOOD PRESSURE: 108 MMHG | OXYGEN SATURATION: 97 % | BODY MASS INDEX: 24.48 KG/M2 | DIASTOLIC BLOOD PRESSURE: 54 MMHG | RESPIRATION RATE: 16 BRPM

## 2022-03-17 DIAGNOSIS — F41.1 GENERALIZED ANXIETY DISORDER: ICD-10-CM

## 2022-03-17 DIAGNOSIS — F33.1 MODERATE EPISODE OF RECURRENT MAJOR DEPRESSIVE DISORDER (HCC): ICD-10-CM

## 2022-03-17 PROCEDURE — 99214 OFFICE O/P EST MOD 30 MIN: CPT | Performed by: PHYSICIAN ASSISTANT

## 2022-03-17 RX ORDER — DULOXETIN HYDROCHLORIDE 30 MG/1
60 CAPSULE, DELAYED RELEASE ORAL DAILY
Qty: 60 CAPSULE | Refills: 1 | Status: SHIPPED | OUTPATIENT
Start: 2022-03-17 | End: 2022-05-05 | Stop reason: SDUPTHER

## 2022-03-17 ASSESSMENT — FIBROSIS 4 INDEX: FIB4 SCORE: 0.27

## 2022-03-23 ASSESSMENT — ENCOUNTER SYMPTOMS
MEMORY LOSS: 0
HALLUCINATIONS: 0
NEUROLOGICAL NEGATIVE: 1
INSOMNIA: 0
GASTROINTESTINAL NEGATIVE: 1
CONSTITUTIONAL NEGATIVE: 1
RESPIRATORY NEGATIVE: 1
EYES NEGATIVE: 1
MUSCULOSKELETAL NEGATIVE: 1
DEPRESSION: 1
CARDIOVASCULAR NEGATIVE: 1
NERVOUS/ANXIOUS: 1

## 2022-03-23 ASSESSMENT — LIFESTYLE VARIABLES: SUBSTANCE_ABUSE: 0

## 2022-03-23 ASSESSMENT — VISUAL ACUITY: OU: 1

## 2022-03-24 ENCOUNTER — HOSPITAL ENCOUNTER (OUTPATIENT)
Facility: MEDICAL CENTER | Age: 27
End: 2022-03-24
Attending: PHYSICIAN ASSISTANT
Payer: COMMERCIAL

## 2022-03-24 ENCOUNTER — OFFICE VISIT (OUTPATIENT)
Dept: MEDICAL GROUP | Facility: CLINIC | Age: 27
End: 2022-03-24
Payer: COMMERCIAL

## 2022-03-24 VITALS
BODY MASS INDEX: 24.59 KG/M2 | HEART RATE: 89 BPM | WEIGHT: 133.6 LBS | OXYGEN SATURATION: 98 % | SYSTOLIC BLOOD PRESSURE: 114 MMHG | DIASTOLIC BLOOD PRESSURE: 78 MMHG | TEMPERATURE: 98.3 F | HEIGHT: 62 IN

## 2022-03-24 DIAGNOSIS — R30.0 DYSURIA: ICD-10-CM

## 2022-03-24 DIAGNOSIS — Z87.440 HISTORY OF RECURRENT UTI (URINARY TRACT INFECTION): ICD-10-CM

## 2022-03-24 LAB
APPEARANCE UR: NORMAL
BILIRUB UR STRIP-MCNC: NEGATIVE MG/DL
COLOR UR AUTO: YELLOW
GLUCOSE UR STRIP.AUTO-MCNC: NORMAL MG/DL
KETONES UR STRIP.AUTO-MCNC: NEGATIVE MG/DL
LEUKOCYTE ESTERASE UR QL STRIP.AUTO: NORMAL
NITRITE UR QL STRIP.AUTO: NEGATIVE
PH UR STRIP.AUTO: 6 [PH] (ref 5–8)
PROT UR QL STRIP: NEGATIVE MG/DL
RBC UR QL AUTO: NEGATIVE
SP GR UR STRIP.AUTO: >1.03
UROBILINOGEN UR STRIP-MCNC: NORMAL MG/DL

## 2022-03-24 PROCEDURE — 99214 OFFICE O/P EST MOD 30 MIN: CPT | Performed by: PHYSICIAN ASSISTANT

## 2022-03-24 PROCEDURE — 87086 URINE CULTURE/COLONY COUNT: CPT

## 2022-03-24 PROCEDURE — 87077 CULTURE AEROBIC IDENTIFY: CPT

## 2022-03-24 PROCEDURE — 87186 SC STD MICRODIL/AGAR DIL: CPT

## 2022-03-24 PROCEDURE — 81002 URINALYSIS NONAUTO W/O SCOPE: CPT | Performed by: PHYSICIAN ASSISTANT

## 2022-03-24 RX ORDER — NITROFURANTOIN 25; 75 MG/1; MG/1
100 CAPSULE ORAL 2 TIMES DAILY
Qty: 10 CAPSULE | Refills: 0 | Status: SHIPPED | OUTPATIENT
Start: 2022-03-24 | End: 2022-07-07

## 2022-03-24 RX ORDER — PHENAZOPYRIDINE HYDROCHLORIDE 200 MG/1
200 TABLET, FILM COATED ORAL 3 TIMES DAILY
Qty: 6 TABLET | Refills: 0 | Status: SHIPPED | OUTPATIENT
Start: 2022-03-24 | End: 2022-03-26

## 2022-03-24 ASSESSMENT — FIBROSIS 4 INDEX: FIB4 SCORE: 0.27

## 2022-03-24 NOTE — PROGRESS NOTES
"  Chief Complaint   Patient presents with   • UTI     Urination pain and smell started 2/22/22 Side kidney pain started this morning        HPI:  Symptom onset: 3 days ago   Current symptoms: Painful, urgent, frequent voids. No blood noted in urine.  Since onset symptoms are: Unchanged  Treatments tried: OTC antispasm med.  Associated symptoms: Negative for fever, flank pain, nausea and vomiting, vaginal discharge, pelvic pain.  History is positive for frequent UTI.     ROS:  Denies fever, chills, vomiting or abdominal pain.     OBJECTIVE:  /78 (BP Location: Left arm, Patient Position: Sitting, BP Cuff Size: Adult)   Pulse 89   Temp 36.8 °C (98.3 °F) (Temporal)   Ht 1.575 m (5' 2\")   Wt 60.6 kg (133 lb 9.6 oz)   SpO2 98%   Gen: Alert, NAD.  Chest: Lungs clear to auscultation, CV RRR.  Abdomen: Soft, tender in suprapubic region. No CVAT. Normal bowel sounds.     No results found for: POCCOLOR, POCAPPEAR, POCLEUKEST, POCNITRITE, POCUROBILIGE, POCPROTEIN, POCURPH, POCBLOOD, POCSPGRV, POCKETONES, POCBILIRUBIN, POCGLUCUA       ASSESSMENT/PLAN:     1. Dysuria    2. History of recurrent UTI (urinary tract infection)        1. Abnormal urine dipstick in office. Urine sent for culture. Start antibiotics.  2. Provided education to drink plenty of fluids, wipe front to back every void and bowel movement.   3. Return to clinic if symptoms not improving within 3-4 days or in case of vomiting, fever, increasing pain.  "

## 2022-03-27 LAB
BACTERIA UR CULT: ABNORMAL
BACTERIA UR CULT: ABNORMAL
SIGNIFICANT IND 70042: ABNORMAL
SITE SITE: ABNORMAL
SOURCE SOURCE: ABNORMAL

## 2022-03-29 ENCOUNTER — PATIENT MESSAGE (OUTPATIENT)
Dept: ENDOCRINOLOGY | Facility: MEDICAL CENTER | Age: 27
End: 2022-03-29
Payer: COMMERCIAL

## 2022-04-06 ENCOUNTER — OFFICE VISIT (OUTPATIENT)
Dept: ENDOCRINOLOGY | Facility: MEDICAL CENTER | Age: 27
End: 2022-04-06
Payer: COMMERCIAL

## 2022-04-06 VITALS
DIASTOLIC BLOOD PRESSURE: 70 MMHG | SYSTOLIC BLOOD PRESSURE: 108 MMHG | OXYGEN SATURATION: 99 % | WEIGHT: 136.3 LBS | BODY MASS INDEX: 25.08 KG/M2 | HEART RATE: 114 BPM | HEIGHT: 62 IN

## 2022-04-06 DIAGNOSIS — E55.9 VITAMIN D DEFICIENCY: ICD-10-CM

## 2022-04-06 DIAGNOSIS — E10.65 UNCONTROLLED TYPE 1 DIABETES MELLITUS WITH HYPERGLYCEMIA (HCC): ICD-10-CM

## 2022-04-06 DIAGNOSIS — E10.3293 TYPE 1 DIABETES MELLITUS WITH MILD NONPROLIFERATIVE DIABETIC RETINOPATHY WITHOUT MACULAR EDEMA, BILATERAL (HCC): ICD-10-CM

## 2022-04-06 LAB
HBA1C MFR BLD: 8 % (ref 0–5.6)
INT CON NEG: ABNORMAL
INT CON POS: ABNORMAL

## 2022-04-06 PROCEDURE — 99214 OFFICE O/P EST MOD 30 MIN: CPT

## 2022-04-06 PROCEDURE — 83036 HEMOGLOBIN GLYCOSYLATED A1C: CPT

## 2022-04-06 PROCEDURE — 99212 OFFICE O/P EST SF 10 MIN: CPT

## 2022-04-06 RX ORDER — INSULIN LISPRO 100 [IU]/ML
60 INJECTION, SOLUTION INTRAVENOUS; SUBCUTANEOUS
Qty: 15 ML | Refills: 3 | Status: SHIPPED | OUTPATIENT
Start: 2022-04-06 | End: 2022-04-11 | Stop reason: SDUPTHER

## 2022-04-06 RX ORDER — INSULIN GLARGINE 100 [IU]/ML
25 INJECTION, SOLUTION SUBCUTANEOUS EVERY EVENING
Qty: 15 ML | Refills: 1 | Status: SHIPPED | OUTPATIENT
Start: 2022-04-06 | End: 2022-09-19 | Stop reason: SDUPTHER

## 2022-04-06 ASSESSMENT — FIBROSIS 4 INDEX: FIB4 SCORE: 0.27

## 2022-04-06 NOTE — PROGRESS NOTES
"CHIEF COMPLAINT: Patient is here for follow up of Type 1 Diabetes Mellitus    HPI:   1.  Uncontrolled type 1 diabetes mellitus with hyperglycemia:  Yesi Gloria is a 26 y.o. female with Type 1 Diabetes Mellitus here for follow up.    Labs from 4/6/2022 HbA1c is 8.0%  POC A1C from 1/26/2022 at 7.7%    On her last appointment she was given supplies for her insulin pump in the hopes that her insurance would approve pump supplies through [employer].  She reports that her  insurance did not change, and they do not cover durable medical equipment at this time.    She is able to use the freestyle 2 as per CGM, she is not very happy with that as she prefers the Dexcom.  Her insurance was able to approve the if we send the prescription to her pharmacy.  She was given the email to set up an account so we are able to download her freestyle readings    She reports low blood sugars between 3 AM and 6 AM occurring daily with higher blood sugars during the day    Diabetes Medications:   Lantus 26 units at -she was titrating her insulin at bedtime by 2 units depending on fasting blood glucose levels but she describes that this was not helpful  Humalog using a 1:8 cho ratio, correction ratio 1:100 above 200     Exercise: very active  Diet: \"healthy\" diet  in general, but reports that he could be better she used to meal prep, and her blood sugars were better controlled when she was meal prepping      Diabetes Complications   Retinopathy: No known retinopathy.    Last eye exam: Up-to-date  Neuropathy: Denies paresthesias or numbness in hands or feet.   Denies any foot wounds.      I ordered some blood work, patient did those at [facility].  Records requested    2.  Vitamin D deficiency:  Currently taking 2000 IUs daily    Patient's medications, allergies, and social histories were reviewed and updated as appropriate.    ROS:     CONS:     No fever, no chills   EYES:     No diplopia, no blurry vision   CV:           No chest " pain, no palpitations   PULM:     No SOB, no cough, no hemoptysis.   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, bilateral ([facility])  2020-03: Major depressive disorder, recurrent episode ([facility])  2019-12: Swelling of both lower extremities  2019-09: Migraine  2017-09: Hydronephrosis  2017-07: Recurrent UTI  2016-03: Exercise-induced bronchospasm  2015-11: Hx of sepsis  2015-11: History of methicillin resistant Staphylococcus aureus   infection  2014-02: Presence of insulin pump      Past Surgical History:  Past Surgical History:   Procedure Laterality Date   • BARTHOLIN GLAND EXCISION     • PILONIDAL CYST EXCISION     • TUBAL COAGULATION LAPAROSCOPIC BILATERAL          Allergies:  Iodine-131 and Iodine     Social History:  Social History     Tobacco Use   • Smoking status: Never Smoker   • Smokeless tobacco: Never Used   Vaping Use   • Vaping Use: Never used   Substance Use Topics   • Alcohol use: Yes     Alcohol/week: 1.2 oz     Types: 2 Standard drinks or equivalent per week     Comment: rarely   • Drug use: Never        Family History:   family history includes Alcohol abuse in her maternal aunt; Breast Cancer in an other family member; Diabetes in her brother, father, maternal grandmother, mother, and paternal grandfather; Hyperlipidemia in her father, mother, and paternal grandfather; Kidney Disease in her maternal grandmother; Lung Disease in her brother; Obesity in her father and mother.      PHYSICAL EXAM:   OBJECTIVE:  Vital signs: /70 (BP Location: Right arm, Patient Position: Sitting, BP Cuff Size: Adult)   Pulse  "(!) 114   Ht 1.575 m (5' 2\")   Wt 61.8 kg (136 lb 4.8 oz)   SpO2 99%   BMI 24.93 kg/m²   GENERAL: Well-developed, well-nourished in no apparent distress.   EYE:  No ocular asymmetry, PERRLA  HENT: Pink, moist mucous membranes.    NECK: No thyromegaly.   CARDIOVASCULAR:  No murmurs  LUNGS: Clear breath sounds  EXTREMITIES: No clubbing, cyanosis, or edema.   NEUROLOGICAL: No gross focal motor abnormalities   LYMPH: No cervical adenopathy palpated.   SKIN: No rashes, lesions.     Labs:  Lab Results   Component Value Date/Time    HBA1C 8.0 (A) 04/06/2022 10:55 AM        Lab Results   Component Value Date/Time    WBC 4.9 11/29/2021 09:44 AM    RBC 4.84 11/29/2021 09:44 AM    HEMOGLOBIN 13.4 11/29/2021 09:44 AM    MCV 85.7 11/29/2021 09:44 AM    MCH 27.7 11/29/2021 09:44 AM    MCHC 32.3 (L) 11/29/2021 09:44 AM    RDW 42.4 11/29/2021 09:44 AM    MPV 10.9 11/29/2021 09:44 AM       Lab Results   Component Value Date/Time    SODIUM 138 11/29/2021 09:44 AM    POTASSIUM 4.4 11/29/2021 09:44 AM    CHLORIDE 103 11/29/2021 09:44 AM    CO2 24 11/29/2021 09:44 AM    ANION 11.0 11/29/2021 09:44 AM    GLUCOSE 147 (H) 11/29/2021 09:44 AM    BUN 12 11/29/2021 09:44 AM    CREATININE 0.67 11/29/2021 09:44 AM    CALCIUM 9.3 11/29/2021 09:44 AM    ASTSGOT 16 11/29/2021 09:44 AM    ALTSGPT 11 11/29/2021 09:44 AM    TBILIRUBIN 0.4 11/29/2021 09:44 AM    ALBUMIN 4.9 11/29/2021 09:44 AM    TOTPROTEIN 7.9 11/29/2021 09:44 AM    GLOBULIN 3.0 11/29/2021 09:44 AM    AGRATIO 1.6 11/29/2021 09:44 AM       Lab Results   Component Value Date/Time    CHOLSTRLTOT 165 11/29/2021 0944    TRIGLYCERIDE 36 11/29/2021 0944    HDL 69 11/29/2021 0944    LDL 89 11/29/2021 0944       Lab Results   Component Value Date/Time    MALBCRT see below 11/29/2021 09:45 AM    MICROALBUR <1.2 11/29/2021 09:45 AM        ASSESSMENT/PLAN:   1. Uncontrolled type 1 diabetes mellitus with hyperglycemia (HCC)  Uncontrolled with an A1c of 8%, and hypoglycemic episodes around 3 " AM and 6 AM    Lantus 26 units at hs-Lantus decreased to 23 units at night  Humalog using a 1:8 cho ratio, correction ratio 1:100 above 200 -carb ratio decreased to 1: 5 for better glycemic control during daytime    Reports that her insurance does not cover durable medical equipment and start an insulin pump and CGMs are not covered.  She is currently using the freestyle 2 miguel that is being covered by her insurance  She requested for the Omnipod to be back to her pharmacy as she found out that it will be covered by her insurance  Rx for Omnipod sent to pharmacy today    Referral to nutritional services sent for diabetes diet education  - Referral to Nutrition Services    2. Vitamin D deficiency  Continue regimen    Disposition: Follow-up in 3 months or sooner if you have Omnipod get approved by insurance so we can help you set it up  Blood work requested from [facility]        Thank you kindly for allowing me to participate in the diabetes care plan for this patient.    KAREN [email or web address].   04/06/22    CC:   Kathleen Garay P.A.-C.

## 2022-04-06 NOTE — PROGRESS NOTES
"RN-CDE Note    Subjective:     HPI:  Yesi Gloria is a 26 y.o. old patient who is seen by the Diabetes Nurse Specialist today for review of her type 1 diabetes.  Hoping to get Omnnipod approved, states her insurance will not cover durable medical equipment and had to stop using her Tandem insulin pump.  Having issues with extreme low blood sugars between 3am and 6 am with resulting hyperglycemia.       Diabetes Medications:   Lantus 26 units at hs  Humalog using a 1:8 cho ratio, correction ratio 1:100 above 200   Taking above medications as prescribed: yes  Taking daily ASA: Not Indicated    Exercise: very active  Diet: \"healthy\" diet  in general  Patient's body mass index is 24.93 kg/m². Exercise and nutrition counseling were performed at this visit.      Health Maintenance:   Health Maintenance Due   Topic Date Due   • IMM HEP B VACCINE (3 of 3 - Risk 3-dose series) 07/08/1996   • COVID-19 Vaccine (1) Never done   • IMM HPV VACCINE (1 - 2-dose series) Never done   • IMM PNEUMOCOCCAL VACCINE: 0-64 Years (2 - PCV) 04/06/2010   • PAP SMEAR  Never done   • IMM DTaP/Tdap/Td Vaccine (7 - Td or Tdap) 05/01/2019   • A1C SCREENING  04/26/2022         DM:   Last A1c:   Lab Results   Component Value Date/Time    HBA1C 8.0 (A) 04/06/2022 10:55 AM      Previous A1c was 7.7 on 1/26/22  A1C GOAL: < 7    Glucose monitoring frequency: Using James CGM    Hypoglycemic episodes: yes - frequent low blood sugars between 3-6 am    Last Retinal Exam: on file and up-to-date  *    Exam:  Monofilament: current, due 11/17/22    Lab Results   Component Value Date/Time    MALBCRT see below 11/29/2021 09:45 AM    MICROALBUR <1.2 11/29/2021 09:45 AM        ACR Albumin/Creatinine Ratio goal <30     HTN:   Blood pressure goal <140/<80 at goal.   Currently Rx ACE/ARB: Not Indicated     Dyslipidemia:    Lab Results   Component Value Date/Time    CHOLSTRLTOT 165 11/29/2021 09:44 AM    LDL 89 11/29/2021 09:44 AM    HDL 69 11/29/2021 09:44 AM    " TRIGLYCERIDE 36 11/29/2021 09:44 AM         Currently Rx Statin: Not Indicated     She  reports that she has never smoked. She has never used smokeless tobacco.      Plan:     Discussed and educated on:   - All medications, side effects and compliance (discussed carefully)  - Annual eye examinations at Ophthalmology  - HbA1C: target  - Home glucose monitoring emphasized  - Weight control and daily exercise    Recommended medication changes: suggest backing off on Lantus to 23 units may need to adjust cho ratio to 1:5 or 6

## 2022-04-08 ENCOUNTER — TELEPHONE (OUTPATIENT)
Dept: ENDOCRINOLOGY | Facility: MEDICAL CENTER | Age: 27
End: 2022-04-08
Payer: COMMERCIAL

## 2022-04-08 NOTE — TELEPHONE ENCOUNTER
Rufina Franklin from [facility] is calling to request a new prescription for patients Dexcom. Can you put in a new order please. Thank you,

## 2022-04-11 ENCOUNTER — TELEPHONE (OUTPATIENT)
Dept: ENDOCRINOLOGY | Facility: MEDICAL CENTER | Age: 27
End: 2022-04-11
Payer: COMMERCIAL

## 2022-04-11 DIAGNOSIS — E10.65 UNCONTROLLED TYPE 1 DIABETES MELLITUS WITH HYPERGLYCEMIA (HCC): ICD-10-CM

## 2022-04-11 PROCEDURE — RXMED WILLOW AMBULATORY MEDICATION CHARGE: Performed by: INTERNAL MEDICINE

## 2022-04-11 PROCEDURE — RXMED WILLOW AMBULATORY MEDICATION CHARGE

## 2022-04-11 RX ORDER — INSULIN LISPRO 100 [IU]/ML
10 INJECTION, SOLUTION INTRAVENOUS; SUBCUTANEOUS
Qty: 30 ML | Refills: 3 | Status: SHIPPED | OUTPATIENT
Start: 2022-04-11 | End: 2022-09-19 | Stop reason: SDUPTHER

## 2022-04-11 RX ORDER — PROCHLORPERAZINE 25 MG/1
1 SUPPOSITORY RECTAL
Qty: 2 EACH | Refills: 2 | Status: SHIPPED | OUTPATIENT
Start: 2022-04-11

## 2022-04-11 RX ORDER — PROCHLORPERAZINE 25 MG/1
1 SUPPOSITORY RECTAL CONTINUOUS
Qty: 9 EACH | Refills: 1 | Status: SHIPPED | OUTPATIENT
Start: 2022-04-11

## 2022-04-11 NOTE — TELEPHONE ENCOUNTER
----- Message from RAFFI AnthonyPHamRHamNHam sent at 4/6/2022  1:27 PM PDT -----  Regarding: Blood work  Hi rob, please request the blood work that done in [facility].     Thanks :)
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
## 2022-05-04 ENCOUNTER — PHARMACY VISIT (OUTPATIENT)
Dept: PHARMACY | Facility: MEDICAL CENTER | Age: 27
End: 2022-05-04
Payer: COMMERCIAL

## 2022-05-05 DIAGNOSIS — F41.1 GENERALIZED ANXIETY DISORDER: ICD-10-CM

## 2022-05-05 DIAGNOSIS — F33.1 MODERATE EPISODE OF RECURRENT MAJOR DEPRESSIVE DISORDER (HCC): ICD-10-CM

## 2022-05-05 RX ORDER — DULOXETIN HYDROCHLORIDE 30 MG/1
60 CAPSULE, DELAYED RELEASE ORAL DAILY
Qty: 180 CAPSULE | Refills: 1 | Status: SHIPPED | OUTPATIENT
Start: 2022-05-05 | End: 2023-07-07

## 2022-07-07 ENCOUNTER — OFFICE VISIT (OUTPATIENT)
Dept: URGENT CARE | Facility: CLINIC | Age: 27
End: 2022-07-07
Payer: COMMERCIAL

## 2022-07-07 VITALS
SYSTOLIC BLOOD PRESSURE: 120 MMHG | BODY MASS INDEX: 24.51 KG/M2 | TEMPERATURE: 98.1 F | DIASTOLIC BLOOD PRESSURE: 78 MMHG | HEIGHT: 62 IN | RESPIRATION RATE: 14 BRPM | OXYGEN SATURATION: 98 % | WEIGHT: 133.2 LBS | HEART RATE: 96 BPM

## 2022-07-07 DIAGNOSIS — E10.3293 TYPE 1 DIABETES MELLITUS WITH MILD NONPROLIFERATIVE DIABETIC RETINOPATHY WITHOUT MACULAR EDEMA, BILATERAL (HCC): ICD-10-CM

## 2022-07-07 DIAGNOSIS — R73.9 HYPERGLYCEMIA: ICD-10-CM

## 2022-07-07 DIAGNOSIS — R11.0 NAUSEA: ICD-10-CM

## 2022-07-07 LAB — GLUCOSE BLD-MCNC: 325 MG/DL (ref 70–100)

## 2022-07-07 PROCEDURE — 82962 GLUCOSE BLOOD TEST: CPT | Performed by: FAMILY MEDICINE

## 2022-07-07 PROCEDURE — 99214 OFFICE O/P EST MOD 30 MIN: CPT | Performed by: FAMILY MEDICINE

## 2022-07-07 RX ORDER — ONDANSETRON HYDROCHLORIDE 8 MG/1
8 TABLET, FILM COATED ORAL EVERY 8 HOURS PRN
Qty: 15 TABLET | Refills: 0 | Status: SHIPPED | OUTPATIENT
Start: 2022-07-07

## 2022-07-07 ASSESSMENT — ENCOUNTER SYMPTOMS
FEVER: 0
VOMITING: 0
NAUSEA: 1

## 2022-07-07 ASSESSMENT — FIBROSIS 4 INDEX: FIB4 SCORE: 0.27

## 2022-07-07 NOTE — PROGRESS NOTES
Subjective:     Yesi Gloria is a 26 y.o. female who presents for Hyperglycemia (24 hrs, pt states administering insulin 2 hrs, high reading 421, nausea)    HPI  Pt presents for evaluation of an acute problem  Pt with elevated blood sugars the past 24 hours   Has had sugars up to 421     Normally   Takes 25 Lantus nightly   1 per 7 carbs   1 per 50 over 250     Today took 5 extra units on top of correction with breakfast   Then took 7 units about 2 hours     No vomiting  Does have nausea    Review of Systems   Constitutional: Negative for fever.   Gastrointestinal: Positive for nausea. Negative for vomiting.   Skin: Negative for rash.       PMH:  has a past medical history of Allergy, Anemia, Anxiety, Asthma, Dehydration (2/12/2021), Depression, Diabetes (HCC), Exercise-induced bronchospasm (3/2/2016), IBD (inflammatory bowel disease), Kidney disease, Migraine, Recurrent UTI (7/26/2017), Retinopathy due to secondary DM ([facility]), Swelling of both lower extremities (12/22/2019), and Urinary tract infection.    She has no past medical history of Addisons disease ([facility]), Adrenal disorder ([facility]), Arrhythmia, Arthritis, Blood transfusion without reported diagnosis, Cancer ([facility]), Cataract, CHF (congestive heart failure) ([facility]), Clotting disorder ([facility]), COPD (chronic obstructive pulmonary disease) ([facility]), Cushings syndrome ([facility]), Diabetic neuropathy ([facility]), Glaucoma, Goiter, Head ache, Heart attack ([facility]), Heart murmur, HIV (human immunodeficiency virus infection) ([facility]), Hyperlipidemia, Hypertension, Meningitis, Osteoporosis, Parathyroid disorder ([facility]), Pituitary disease ([facility]), Pulmonary emphysema ([facility]), Seizure ([facility]), Sickle cell disease ([facility]), Stroke ([facility]), Substance abuse ([facility]), Thyroid disease, or Tuberculosis.  MEDS:   Current Outpatient Medications:   •  DULoxetine (CYMBALTA) 30 MG Cap DR Particles, Take 2 Capsules by mouth every day., Disp: 180 Capsule, Rfl: 1  •  Continuous Blood Gluc Sensor (FREESTYLE AIDE 2 SENSOR)  Misc, 1 Each by Other route every 14 days., Disp: 6 Each, Rfl: 3  •  Insulin Pen Needle 32 G x 4 mm, 1 each 4 times a day,before meals and at bedtime., Disp: 400 Each, Rfl: 3  •  insulin lispro (HUMALOG,ADMELOG) 100 UNIT/ML Solution Pen-injector injection PEN, Inject 10 Units under the skin 3 times a day before meals., Disp: 30 mL, Rfl: 3  •  Continuous Blood Gluc Transmit (DEXCOM G6 TRANSMITTER) Misc, 1 Applicator every 3 months., Disp: 2 Each, Rfl: 2  •  Continuous Blood Gluc  (DEXCOM G6 ) Device, 1 Applicator continuous., Disp: 9 Each, Rfl: 1  •  insulin glargine (LANTUS SOLOSTAR) 100 UNIT/ML Solution Pen-injector injection, Inject 25 Units under the skin every evening., Disp: 15 mL, Rfl: 1  •  Blood Glucose Test Strips, Use one Freestyle James 2 strip to test blood sugar four times daily before meals and at bedtime., Disp: 400 Strip, Rfl: 3  •  Blood Glucose Meter Kit, Test blood sugar as recommended by provider. Freestyle James 2 blood glucose monitoring kit., Disp: 1 Kit, Rfl: 0  •  Lancets, Use one Freestyle James 2 lancet to test blood sugar four times a day, before meals and at bedtime., Disp: 400 Each, Rfl: 3  •  sumatriptan (IMITREX) 100 MG tablet, Take 1 tablet by mouth at onset of migraine headache. May repeat after 2 hours if migraine is not relieved. Do not exceed 2 tablets in 24 hours, Disp: , Rfl:   ALLERGIES:   Allergies   Allergen Reactions   • Iodine-131 Nausea   • Iodine      Vomiting     SURGHX:   Past Surgical History:   Procedure Laterality Date   • BARTHOLIN GLAND EXCISION     • PILONIDAL CYST EXCISION     • TUBAL COAGULATION LAPAROSCOPIC BILATERAL       SOCHX:  reports that she has never smoked. She has never used smokeless tobacco. She reports current alcohol use of about 1.2 oz of alcohol per week. She reports that she does not use drugs.     Objective:   /78 (BP Location: Left arm, Patient Position: Sitting, BP Cuff Size: Adult)   Pulse 96   Temp 36.7 °C (98.1 °F)  "(Temporal)   Resp 14   Ht 1.575 m (5' 2\")   Wt 60.4 kg (133 lb 3.2 oz)   SpO2 98%   BMI 24.36 kg/m²     Physical Exam  Constitutional:       General: She is not in acute distress.     Appearance: She is well-developed. She is not diaphoretic.   Pulmonary:      Effort: Pulmonary effort is normal.   Neurological:      Mental Status: She is alert.       Assessment/Plan:   Assessment    1. 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, bilateral (HCC)  - POCT Glucose  - ondansetron (ZOFRAN) 8 MG Tab; Take 1 Tablet by mouth every 8 hours as needed for Nausea/Vomiting.  Dispense: 15 Tablet; Refill: 0    2. Hyperglycemia  - POCT Glucose  - ondansetron (ZOFRAN) 8 MG Tab; Take 1 Tablet by mouth every 8 hours as needed for Nausea/Vomiting.  Dispense: 15 Tablet; Refill: 0    3. Nausea  - ondansetron (ZOFRAN) 8 MG Tab; Take 1 Tablet by mouth every 8 hours as needed for Nausea/Vomiting.  Dispense: 15 Tablet; Refill: 0    Point-of-care glucose in office today 325.  Has been as high as 421 at home.  Discussed diet modifications and how to increase her home Humalog to  slowly get the blood sugars down without becoming hypoglycemic.  Emphasized importance of hydration.  Patient was given a refill prescription of Zofran in order to improve her p.o. intake.  Patient has been hospitalized for DKA in the past.  Does not appear to be in true DKA at this time, however she is high risk for hospitalization over the next few days.  Patient feels comfortable with ER precautions and will go to hospital if sugars starting to increase again over the next 24 hours.  Patient will contact her endocrinologist tonight/tomorrow for further instructions.  Follow-up in urgent care as needed.    "

## 2022-07-08 ENCOUNTER — APPOINTMENT (OUTPATIENT)
Dept: ENDOCRINOLOGY | Facility: MEDICAL CENTER | Age: 27
End: 2022-07-08

## 2022-07-21 DIAGNOSIS — E10.65 UNCONTROLLED TYPE 1 DIABETES MELLITUS WITH HYPERGLYCEMIA (HCC): ICD-10-CM

## 2022-08-09 DIAGNOSIS — E10.65 UNCONTROLLED TYPE 1 DIABETES MELLITUS WITH HYPERGLYCEMIA (HCC): ICD-10-CM

## 2022-08-09 PROCEDURE — RXMED WILLOW AMBULATORY MEDICATION CHARGE

## 2022-08-10 ENCOUNTER — PHARMACY VISIT (OUTPATIENT)
Dept: PHARMACY | Facility: MEDICAL CENTER | Age: 27
End: 2022-08-10
Payer: COMMERCIAL

## 2022-08-10 ENCOUNTER — TELEPHONE (OUTPATIENT)
Dept: PHARMACY | Facility: MEDICAL CENTER | Age: 27
End: 2022-08-10
Payer: COMMERCIAL

## 2022-08-10 PROCEDURE — RXMED WILLOW AMBULATORY MEDICATION CHARGE

## 2022-08-10 NOTE — TELEPHONE ENCOUNTER
Spoke to patient and she is doing well. Insurance has been straightened out. No concerns or questions. She will get Sensors,Humalog and Lantus via [employer] tomorrow 8/11. Copay [phone].

## 2022-09-15 DIAGNOSIS — E10.65 UNCONTROLLED TYPE 1 DIABETES MELLITUS WITH HYPERGLYCEMIA (HCC): ICD-10-CM

## 2022-09-15 DIAGNOSIS — E10.3293 TYPE 1 DIABETES MELLITUS WITH MILD NONPROLIFERATIVE DIABETIC RETINOPATHY WITHOUT MACULAR EDEMA, BILATERAL (HCC): ICD-10-CM

## 2022-09-15 RX ORDER — INSULIN LISPRO 100 [IU]/ML
10 INJECTION, SOLUTION INTRAVENOUS; SUBCUTANEOUS
Qty: 30 ML | Refills: 3 | Status: CANCELLED | OUTPATIENT
Start: 2022-09-15

## 2022-09-15 RX ORDER — LANCETS 30 GAUGE
EACH MISCELLANEOUS
Qty: 400 EACH | Refills: 3 | Status: CANCELLED | OUTPATIENT
Start: 2022-09-15

## 2022-09-19 DIAGNOSIS — E10.65 UNCONTROLLED TYPE 1 DIABETES MELLITUS WITH HYPERGLYCEMIA (HCC): ICD-10-CM

## 2022-09-19 DIAGNOSIS — E10.3293 TYPE 1 DIABETES MELLITUS WITH MILD NONPROLIFERATIVE DIABETIC RETINOPATHY WITHOUT MACULAR EDEMA, BILATERAL (HCC): ICD-10-CM

## 2022-09-19 RX ORDER — INSULIN LISPRO 100 [IU]/ML
10 INJECTION, SOLUTION INTRAVENOUS; SUBCUTANEOUS
Qty: 30 ML | Refills: 3 | Status: SHIPPED | OUTPATIENT
Start: 2022-09-19 | End: 2022-09-20 | Stop reason: SDUPTHER

## 2022-09-19 RX ORDER — INSULIN GLARGINE 100 [IU]/ML
25 INJECTION, SOLUTION SUBCUTANEOUS EVERY EVENING
Qty: 15 ML | Refills: 1 | Status: SHIPPED | OUTPATIENT
Start: 2022-09-19 | End: 2022-09-20 | Stop reason: SDUPTHER

## 2022-09-19 RX ORDER — INSULIN LISPRO 100 [IU]/ML
10 INJECTION, SOLUTION INTRAVENOUS; SUBCUTANEOUS
Qty: 30 ML | Refills: 3 | Status: SHIPPED | OUTPATIENT
Start: 2022-09-19 | End: 2022-09-19 | Stop reason: SDUPTHER

## 2022-09-19 RX ORDER — INSULIN GLARGINE 100 [IU]/ML
25 INJECTION, SOLUTION SUBCUTANEOUS EVERY EVENING
Qty: 15 ML | Refills: 1 | Status: SHIPPED | OUTPATIENT
Start: 2022-09-19 | End: 2022-09-19 | Stop reason: SDUPTHER

## 2022-09-19 NOTE — TELEPHONE ENCOUNTER
Spoke to patient - she needs to fill these with [employer] mail service now. Please send RX's to [facility].

## 2022-09-20 DIAGNOSIS — E10.65 UNCONTROLLED TYPE 1 DIABETES MELLITUS WITH HYPERGLYCEMIA (HCC): ICD-10-CM

## 2022-09-20 DIAGNOSIS — E10.3293 TYPE 1 DIABETES MELLITUS WITH MILD NONPROLIFERATIVE DIABETIC RETINOPATHY WITHOUT MACULAR EDEMA, BILATERAL (HCC): ICD-10-CM

## 2022-09-20 RX ORDER — INSULIN LISPRO 100 [IU]/ML
10 INJECTION, SOLUTION INTRAVENOUS; SUBCUTANEOUS
Qty: 30 ML | Refills: 1 | Status: SHIPPED | OUTPATIENT
Start: 2022-09-20 | End: 2023-03-11

## 2022-09-20 RX ORDER — INSULIN GLARGINE 100 [IU]/ML
25 INJECTION, SOLUTION SUBCUTANEOUS EVERY EVENING
Qty: 30 ML | Refills: 1 | Status: SHIPPED | OUTPATIENT
Start: 2022-09-20 | End: 2023-07-07 | Stop reason: SDUPTHER

## 2022-10-27 ENCOUNTER — PATIENT MESSAGE (OUTPATIENT)
Dept: ENDOCRINOLOGY | Facility: MEDICAL CENTER | Age: 27
End: 2022-10-27
Payer: COMMERCIAL

## 2022-10-27 DIAGNOSIS — E10.65 UNCONTROLLED TYPE 1 DIABETES MELLITUS WITH HYPERGLYCEMIA (HCC): ICD-10-CM

## 2022-11-01 RX ORDER — BLOOD-GLUCOSE SENSOR
1 EACH MISCELLANEOUS
Qty: 2 EACH | Refills: 11 | Status: SHIPPED
Start: 2022-11-01 | End: 2022-11-08

## 2022-11-01 RX ORDER — BLOOD-GLUCOSE SENSOR
EACH MISCELLANEOUS
Status: CANCELLED | OUTPATIENT
Start: 2022-11-01

## 2022-11-08 DIAGNOSIS — E10.65 UNCONTROLLED TYPE 1 DIABETES MELLITUS WITH HYPERGLYCEMIA (HCC): ICD-10-CM

## 2023-02-15 DIAGNOSIS — E10.65 UNCONTROLLED TYPE 1 DIABETES MELLITUS WITH HYPERGLYCEMIA (HCC): ICD-10-CM

## 2023-02-15 RX ORDER — PROCHLORPERAZINE 25 MG/1
1 SUPPOSITORY RECTAL
Qty: 9 EACH | Refills: 3 | Status: SHIPPED | OUTPATIENT
Start: 2023-02-15 | End: 2023-04-28 | Stop reason: SDUPTHER

## 2023-02-15 RX ORDER — PROCHLORPERAZINE 25 MG/1
1 SUPPOSITORY RECTAL
Qty: 1 EACH | Refills: 3 | Status: SHIPPED | OUTPATIENT
Start: 2023-02-15 | End: 2023-04-28 | Stop reason: SDUPTHER

## 2023-02-24 ENCOUNTER — TELEPHONE (OUTPATIENT)
Dept: ENDOCRINOLOGY | Facility: MEDICAL CENTER | Age: 28
End: 2023-02-24
Payer: COMMERCIAL

## 2023-02-24 NOTE — TELEPHONE ENCOUNTER
Prior Authorization for Dexcom G6 Device (Quantity: 1, Days: 365) has been submitted via [id number]    Insurance: [employer] rx/medicaid    Will follow up in 24-48 business hours.

## 2023-04-04 ENCOUNTER — TELEPHONE (OUTPATIENT)
Dept: ENDOCRINOLOGY | Facility: MEDICAL CENTER | Age: 28
End: 2023-04-04
Payer: COMMERCIAL

## 2023-04-28 DIAGNOSIS — E10.65 UNCONTROLLED TYPE 1 DIABETES MELLITUS WITH HYPERGLYCEMIA (HCC): ICD-10-CM

## 2023-04-28 RX ORDER — PROCHLORPERAZINE 25 MG/1
1 SUPPOSITORY RECTAL
Qty: 6 EACH | Refills: 3 | Status: SHIPPED | OUTPATIENT
Start: 2023-04-28 | End: 2023-11-22

## 2023-04-28 RX ORDER — PROCHLORPERAZINE 25 MG/1
1 SUPPOSITORY RECTAL
Qty: 1 EACH | Refills: 3 | Status: SHIPPED | OUTPATIENT
Start: 2023-04-28 | End: 2024-01-24

## 2023-07-07 ENCOUNTER — TELEPHONE (OUTPATIENT)
Dept: ENDOCRINOLOGY | Facility: MEDICAL CENTER | Age: 28
End: 2023-07-07

## 2023-07-07 ENCOUNTER — OFFICE VISIT (OUTPATIENT)
Dept: ENDOCRINOLOGY | Facility: MEDICAL CENTER | Age: 28
End: 2023-07-07
Payer: COMMERCIAL

## 2023-07-07 ENCOUNTER — HOSPITAL ENCOUNTER (OUTPATIENT)
Facility: MEDICAL CENTER | Age: 28
End: 2023-07-07
Payer: COMMERCIAL

## 2023-07-07 VITALS
OXYGEN SATURATION: 100 % | BODY MASS INDEX: 23.1 KG/M2 | HEIGHT: 64 IN | SYSTOLIC BLOOD PRESSURE: 110 MMHG | DIASTOLIC BLOOD PRESSURE: 72 MMHG | HEART RATE: 117 BPM | WEIGHT: 135.3 LBS

## 2023-07-07 DIAGNOSIS — E10.3293 TYPE 1 DIABETES MELLITUS WITH MILD NONPROLIFERATIVE DIABETIC RETINOPATHY WITHOUT MACULAR EDEMA, BILATERAL (HCC): ICD-10-CM

## 2023-07-07 DIAGNOSIS — E55.9 VITAMIN D DEFICIENCY: ICD-10-CM

## 2023-07-07 DIAGNOSIS — E10.65 UNCONTROLLED TYPE 1 DIABETES MELLITUS WITH HYPERGLYCEMIA (HCC): ICD-10-CM

## 2023-07-07 LAB
AMBIGUOUS DTTM AMBI4: NORMAL
CREAT UR-MCNC: 133.33 MG/DL
HBA1C MFR BLD: 9.1 % (ref ?–5.8)
MICROALBUMIN UR-MCNC: 1.5 MG/DL
MICROALBUMIN/CREAT UR: 11 MG/G (ref 0–30)
POCT INT CON NEG: NEGATIVE
POCT INT CON POS: POSITIVE

## 2023-07-07 PROCEDURE — 3074F SYST BP LT 130 MM HG: CPT

## 2023-07-07 PROCEDURE — 82043 UR ALBUMIN QUANTITATIVE: CPT

## 2023-07-07 PROCEDURE — 3078F DIAST BP <80 MM HG: CPT

## 2023-07-07 PROCEDURE — 99215 OFFICE O/P EST HI 40 MIN: CPT

## 2023-07-07 PROCEDURE — 92250 FUNDUS PHOTOGRAPHY W/I&R: CPT

## 2023-07-07 PROCEDURE — 83036 HEMOGLOBIN GLYCOSYLATED A1C: CPT

## 2023-07-07 PROCEDURE — 82570 ASSAY OF URINE CREATININE: CPT

## 2023-07-07 PROCEDURE — 99212 OFFICE O/P EST SF 10 MIN: CPT

## 2023-07-07 RX ORDER — INSULIN GLARGINE 100 [IU]/ML
25 INJECTION, SOLUTION SUBCUTANEOUS EVERY EVENING
Qty: 30 ML | Refills: 1 | Status: SHIPPED | OUTPATIENT
Start: 2023-07-07

## 2023-07-07 RX ORDER — INSULIN LISPRO 100 [IU]/ML
10 INJECTION, SOLUTION INTRAVENOUS; SUBCUTANEOUS
Qty: 30 ML | Refills: 0 | Status: SHIPPED | OUTPATIENT
Start: 2023-07-07 | End: 2023-09-11

## 2023-07-07 RX ORDER — ESCITALOPRAM OXALATE 10 MG/1
20 TABLET ORAL DAILY
COMMUNITY
Start: 2023-05-24

## 2023-07-07 ASSESSMENT — FIBROSIS 4 INDEX: FIB4 SCORE: 0.25

## 2023-07-07 NOTE — PROGRESS NOTES
"CHIEF COMPLAINT: Patient is here for follow up of the following conditions    HPI:   Yesi Gloria is a 27 y.o. female      1.  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    POC a1c on 7/7/2023 at 9.1%  Labs from 4/6/2022 HbA1c is 8.0%  POC A1C from 1/26/2022 at 7.7%    Checking BG 3x/day with fasting BG at 120- and occasionally 80s    Diabetes Medications:   Lantus 25 units at hs-  Humalog using a 1:8 cho ratio, correction ratio 1:100 above 150    Pt's son got approved for Dexcom so patient was able to get her insurance to approve her Dexcom.   Tandem but not using.     She reports low blood sugars occassionally       Exercise: minimal  Diet: \"healthy\" diet  in general, she works from home     Diabetes Complications   Retinopathy: No known retinopathy.    Last eye exam: 2 years ago     Neuropathy: Denies paresthesias or numbness in hands or feet.   Denies any foot wounds.                  2.  Vitamin D deficiency:  Currently not taking any supplemention        Patient's medications, allergies, and social histories were reviewed and updated as appropriate.    ROS:     CONS:     No fever, no chills   EYES:     No diplopia, no blurry vision   CV:           No chest pain, no palpitations   PULM:     No SOB, no cough, no hemoptysis.   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, bilateral ([facility])  2020-03: Major depressive disorder, recurrent episode ([facility])  2019-12: Swelling of both lower extremities  2019-09: " "Migraine  2017-09: Hydronephrosis  2017-07: Recurrent UTI  2016-03: Exercise-induced bronchospasm  2015-11: Hx of sepsis  2015-11: History of methicillin resistant Staphylococcus aureus   infection  2014-02: Presence of insulin pump      Past Surgical History:  Past Surgical History:   Procedure Laterality Date    BARTHOLIN GLAND EXCISION      PILONIDAL CYST EXCISION      TUBAL COAGULATION LAPAROSCOPIC BILATERAL          Allergies:  Iodine-131 and Iodine     Social History:  Social History     Tobacco Use    Smoking status: Never    Smokeless tobacco: Never   Vaping Use    Vaping Use: Never used   Substance Use Topics    Alcohol use: Yes     Alcohol/week: 1.2 oz     Types: 2 Standard drinks or equivalent per week     Comment: rarely    Drug use: Never        Family History:   family history includes Alcohol abuse in her maternal aunt; Breast Cancer in an other family member; Diabetes in her brother, father, maternal grandmother, mother, and paternal grandfather; Hyperlipidemia in her father, mother, and paternal grandfather; Kidney Disease in her maternal grandmother; Lung Disease in her brother; Obesity in her father and mother.      PHYSICAL EXAM:   OBJECTIVE:  Vital signs: /72 (BP Location: Right arm, Patient Position: Sitting)   Pulse (!) 117   Ht 1.626 m (5' 4\")   Wt 61.4 kg (135 lb 4.8 oz)   SpO2 100%   BMI 23.22 kg/m²   GENERAL: Well-developed, well-nourished in no apparent distress.   EYE:  No ocular asymmetry, PERRLA  HENT: Pink, moist mucous membranes.    NECK: No thyromegaly.   CARDIOVASCULAR:  No murmurs  LUNGS: Clear breath sounds  EXTREMITIES: No clubbing, cyanosis, or edema.   NEUROLOGICAL: No gross focal motor abnormalities   LYMPH: No cervical adenopathy palpated.   SKIN: No rashes, lesions.     Labs:  Lab Results   Component Value Date/Time    HBA1C 8.0 (A) 04/06/2022 10:55 AM        Lab Results   Component Value Date/Time    WBC 4.9 11/29/2021 09:44 AM    RBC 4.75 03/09/2023 12:53 PM    " RBC 4.84 11/29/2021 09:44 AM    HEMOGLOBIN 13.3 03/09/2023 12:53 PM    HEMOGLOBIN 13.4 11/29/2021 09:44 AM    MCV 83.4 03/09/2023 12:53 PM    MCV 85.7 11/29/2021 09:44 AM    MCH 28.0 03/09/2023 12:53 PM    MCH 27.7 11/29/2021 09:44 AM    MCHC 33.6 03/09/2023 12:53 PM    MCHC 32.3 (L) 11/29/2021 09:44 AM    RDW 12.8 03/09/2023 12:53 PM    RDW 42.4 11/29/2021 09:44 AM    MPV 8.8 03/09/2023 12:53 PM    MPV 10.9 11/29/2021 09:44 AM       Lab Results   Component Value Date/Time    SODIUM 132 (L) 03/09/2023 12:53 PM    SODIUM 138 11/29/2021 09:44 AM    POTASSIUM 3.8 03/09/2023 12:53 PM    POTASSIUM 4.4 11/29/2021 09:44 AM    CHLORIDE 98 (L) 03/09/2023 12:53 PM    CHLORIDE 103 11/29/2021 09:44 AM    CO2 22 03/09/2023 12:53 PM    CO2 24 11/29/2021 09:44 AM    ANION 12 (H) 03/09/2023 12:53 PM    ANION 11.0 11/29/2021 09:44 AM    GLUCOSE 349 (H) 03/09/2023 12:53 PM    GLUCOSE 147 (H) 11/29/2021 09:44 AM    BUN 12 03/09/2023 12:53 PM    BUN 12 11/29/2021 09:44 AM    CREATININE 0.69 03/09/2023 12:53 PM    CREATININE 0.67 11/29/2021 09:44 AM    CALCIUM 8.9 03/09/2023 12:53 PM    CALCIUM 9.3 11/29/2021 09:44 AM    ASTSGOT 11 (L) 03/09/2023 12:53 PM    ASTSGOT 16 11/29/2021 09:44 AM    ALTSGPT 8 (L) 03/09/2023 12:53 PM    ALTSGPT 11 11/29/2021 09:44 AM    TBILIRUBIN 0.6 03/09/2023 12:53 PM    TBILIRUBIN 0.4 11/29/2021 09:44 AM    ALBUMIN 4.1 03/09/2023 12:53 PM    ALBUMIN 4.9 11/29/2021 09:44 AM    TOTPROTEIN 7.3 03/09/2023 12:53 PM    TOTPROTEIN 7.9 11/29/2021 09:44 AM    GLOBULIN 3.2 (H) 03/09/2023 12:53 PM    GLOBULIN 3.0 11/29/2021 09:44 AM    AGRATIO 1.3 03/09/2023 12:53 PM    AGRATIO 1.6 11/29/2021 09:44 AM       Lab Results   Component Value Date/Time    CHOLSTRLTOT 165 11/29/2021 0944    TRIGLYCERIDE 36 11/29/2021 0944    HDL 69 11/29/2021 0944    LDL 89 11/29/2021 0944       Lab Results   Component Value Date/Time    MALBCRT see below 11/29/2021 09:45 AM    MICROALBUR 10.5 (H) 02/18/2022 09:28 AM    MICROALBUR <1.2  11/29/2021 09:45 AM    MICROALBTIM 50.0 (H) 02/18/2022 09:28 AM        ASSESSMENT/PLAN:   1. 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, bilateral (HCC)  Uncontrolled with an A1c of 9.1%  Lifestyle modifications discussed  - Stable hydrated  - Eat a healthy diet, minimal carbohydrate, portion control  - Exercise at least 150 minutes/week  Discussed importance of doing insulin at least 10 to 15 minutes prior to meals  She will be receiving her Dexcom CGM next week,  approved by her insurance  We will be discussing differing insulins at next appointment  Discussed diabetes complications, how to prevent complications  POC microalbumin and retinal eye exam done in office      Diabetes Medications:   Lantus 25 units at hs--continue, discussed increase by 1 unit for every 3 consecutive morning blood sugar above 120, goal of morning blood sugar should be between 90 and 120  Humalog using a 1:8 cho ratio, correction ratio 1:100 above 150-discussed how to adjust carb rations, new correction factor I: 30      - POCT Hemoglobin A1C  - insulin glargine (LANTUS SOLOSTAR) 100 UNIT/ML Solution Pen-injector injection; Inject 25 Units under the skin every evening.  Dispense: 30 mL; Refill: 1  - insulin lispro (HUMALOG KWIKPEN) 100 UNIT/ML SC SOPN injection PEN; Inject 10 Units under the skin 3 times a day before meals. Please make and keep appointment for any future refills  Dispense: 30 mL; Refill: 0    2. Vitamin D deficiency  Unstable  Encouraged to take 2000 IUs OTC daily vitamin D3      Disposition: Follow-up in 3 months  I will request records from [facility] from her last blood work      Thank you kindly for allowing me to participate in the diabetes care plan for this patient.  I spent 40 minutes on this visit, precharting, discussing pathophysiology of her diagnosis, complications, treatment plan, medications    Mynor Tatum A.P.R.N.   07/07/23      CC:   Kathleen NOGUERA  JOSE Garay.

## 2023-07-07 NOTE — TELEPHONE ENCOUNTER
Patient is calling stating her Lantus prescription did not go through.     I informed her we will follow up with pharmacy and return her call.

## 2023-07-13 LAB — RETINAL SCREEN: NEGATIVE

## 2023-08-17 ENCOUNTER — OFFICE VISIT (OUTPATIENT)
Dept: ENDOCRINOLOGY | Facility: MEDICAL CENTER | Age: 28
End: 2023-08-17
Payer: COMMERCIAL

## 2023-08-17 VITALS
BODY MASS INDEX: 23.73 KG/M2 | OXYGEN SATURATION: 97 % | HEIGHT: 64 IN | WEIGHT: 139 LBS | DIASTOLIC BLOOD PRESSURE: 60 MMHG | HEART RATE: 100 BPM | SYSTOLIC BLOOD PRESSURE: 108 MMHG

## 2023-08-17 DIAGNOSIS — E55.9 VITAMIN D DEFICIENCY: ICD-10-CM

## 2023-08-17 DIAGNOSIS — E10.3299 TYPE 1 DIABETES MELLITUS WITH MILD NONPROLIFERATIVE RETINOPATHY WITHOUT MACULAR EDEMA, UNSPECIFIED LATERALITY (HCC): ICD-10-CM

## 2023-08-17 PROCEDURE — 3074F SYST BP LT 130 MM HG: CPT

## 2023-08-17 PROCEDURE — 99214 OFFICE O/P EST MOD 30 MIN: CPT

## 2023-08-17 PROCEDURE — 3078F DIAST BP <80 MM HG: CPT

## 2023-08-17 PROCEDURE — 99211 OFF/OP EST MAY X REQ PHY/QHP: CPT

## 2023-08-17 RX ORDER — INSULIN PMP CART,AUT,G6/7,CNTR
1 EACH SUBCUTANEOUS
Qty: 9 EACH | Refills: 11 | Status: SHIPPED | OUTPATIENT
Start: 2023-08-17 | End: 2023-09-21

## 2023-08-17 RX ORDER — INSULIN PMP CART,AUT,G6/7,CNTR
1 EACH SUBCUTANEOUS
Qty: 1 KIT | Refills: 0 | Status: SHIPPED | OUTPATIENT
Start: 2023-08-17

## 2023-08-17 ASSESSMENT — FIBROSIS 4 INDEX: FIB4 SCORE: 0.25

## 2023-08-17 NOTE — PROGRESS NOTES
"CHIEF COMPLAINT: Patient is here for follow up of the following conditions    HPI:   Yesi Gloria is a 27 y.o. female      1.  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    POC a1c on 7/7/2023 at 9.1%  Labs from 4/6/2022 HbA1c is 8.0%  POC A1C from 1/26/2022 at 7.7%    Using Dexcom G6 CGM for the past two months, we gave patient a sharing code for us to download on next appt  Pt reports am BG at 80-160s in the am, she also reports higher blood sugars after lunch and dinner  Patient here today to discuss insulin pump, she was placed by her insurance only cover the Omnipod    Diabetes Medications:   [facility]  [facility], correction ratio 1:30 above 130    Pt's son got approved for Dexcom so patient was able to get her insurance to approve her Dexcom.   Tandem but not using.     She reports low blood sugars occassionally       Exercise: minimal  Diet: \"healthy\" diet  in general, she works from home     Diabetes Complications   Retinopathy: No known retinopathy.    Last eye exam: 2 years ago      Latest Reference Range & Units 07/07/23 10:21   RETINAL SCREEN Negative, Indeterminate  Negative       Neuropathy: Denies paresthesias or numbness in hands or feet.   Denies any foot wounds.   Latest Reference Range & Units 07/07/23 00:30   Micro Alb Creat Ratio 0 - 30 mg/g 11   Creatinine, Urine mg/dL 133.33   Microalbumin, Urine Random mg/dL 1.5                       2.  Vitamin D deficiency:  Currently not taking any supplemention        Patient's medications, allergies, and social histories were reviewed and updated as appropriate.    ROS:     CONS:     No fever, no chills   EYES:     No diplopia, no blurry vision   CV:           No chest pain, no palpitations   PULM:     No SOB, no cough, no hemoptysis.   GI:            No nausea, no vomiting, no diarrhea, no constipation   ENDO: " "    No polyuria, no polydipsia, no heat intolerance, no cold intolerance       Past Medical History:  Problem List:  2022-03: Generalized anxiety disorder  2021-12: Low iron  2021-12: Vitamin D deficiency  2021-02: Nausea and vomiting  2021-02: Dehydration  2020-11: Type 1 diabetes mellitus with mild nonproliferative diabetic   retinopathy without macular edema, bilateral (HCC)  2020-03: Major depressive disorder, recurrent episode (HCC)  2019-12: Swelling of both lower extremities  2019-09: Migraine  2017-09: Hydronephrosis  2017-07: Recurrent UTI  2016-03: Exercise-induced bronchospasm  2015-11: Hx of sepsis  2015-11: History of methicillin resistant Staphylococcus aureus   infection  2014-02: Presence of insulin pump      Past Surgical History:  Past Surgical History:   Procedure Laterality Date    BARTHOLIN GLAND EXCISION      PILONIDAL CYST EXCISION      TUBAL COAGULATION LAPAROSCOPIC BILATERAL          Allergies:  Iodine-131 and Iodine     Social History:  Social History     Tobacco Use    Smoking status: Never    Smokeless tobacco: Never   Vaping Use    Vaping Use: Never used   Substance Use Topics    Alcohol use: Yes     Alcohol/week: 1.2 oz     Types: 2 Standard drinks or equivalent per week     Comment: rarely    Drug use: Never        Family History:   family history includes Alcohol abuse in her maternal aunt; Breast Cancer in an other family member; Diabetes in her brother, father, maternal grandmother, mother, and paternal grandfather; Hyperlipidemia in her father, mother, and paternal grandfather; Kidney Disease in her maternal grandmother; Lung Disease in her brother; Obesity in her father and mother.      PHYSICAL EXAM:   OBJECTIVE:  Vital signs: /60 (BP Location: Left arm, Patient Position: Sitting, BP Cuff Size: Adult)   Pulse 100   Ht 1.626 m (5' 4\")   Wt 63 kg (139 lb)   SpO2 97%   BMI 23.86 kg/m²   GENERAL: Well-developed, well-nourished in no apparent distress.   SKIN: No rashes, " lesions.     Labs:  Lab Results   Component Value Date/Time    HBA1C 9.1 (A) 07/07/2023 09:55 AM        Lab Results   Component Value Date/Time    WBC 4.9 11/29/2021 09:44 AM    RBC 4.75 03/09/2023 12:53 PM    RBC 4.84 11/29/2021 09:44 AM    HEMOGLOBIN 13.3 03/09/2023 12:53 PM    HEMOGLOBIN 13.4 11/29/2021 09:44 AM    MCV 83.4 03/09/2023 12:53 PM    MCV 85.7 11/29/2021 09:44 AM    MCH 28.0 03/09/2023 12:53 PM    MCH 27.7 11/29/2021 09:44 AM    MCHC 33.6 03/09/2023 12:53 PM    MCHC 32.3 (L) 11/29/2021 09:44 AM    RDW 12.8 03/09/2023 12:53 PM    RDW 42.4 11/29/2021 09:44 AM    MPV 8.8 03/09/2023 12:53 PM    MPV 10.9 11/29/2021 09:44 AM       Lab Results   Component Value Date/Time    SODIUM 132 (L) 03/09/2023 12:53 PM    SODIUM 138 11/29/2021 09:44 AM    POTASSIUM 3.8 03/09/2023 12:53 PM    POTASSIUM 4.4 11/29/2021 09:44 AM    CHLORIDE 98 (L) 03/09/2023 12:53 PM    CHLORIDE 103 11/29/2021 09:44 AM    CO2 22 03/09/2023 12:53 PM    CO2 24 11/29/2021 09:44 AM    ANION 12 (H) 03/09/2023 12:53 PM    ANION 11.0 11/29/2021 09:44 AM    GLUCOSE 349 (H) 03/09/2023 12:53 PM    GLUCOSE 147 (H) 11/29/2021 09:44 AM    BUN 12 03/09/2023 12:53 PM    BUN 12 11/29/2021 09:44 AM    CREATININE 0.69 03/09/2023 12:53 PM    CREATININE 0.67 11/29/2021 09:44 AM    CALCIUM 8.9 03/09/2023 12:53 PM    CALCIUM 9.3 11/29/2021 09:44 AM    ASTSGOT 11 (L) 03/09/2023 12:53 PM    ASTSGOT 16 11/29/2021 09:44 AM    ALTSGPT 8 (L) 03/09/2023 12:53 PM    ALTSGPT 11 11/29/2021 09:44 AM    TBILIRUBIN 0.6 03/09/2023 12:53 PM    TBILIRUBIN 0.4 11/29/2021 09:44 AM    ALBUMIN 4.1 03/09/2023 12:53 PM    ALBUMIN 4.9 11/29/2021 09:44 AM    TOTPROTEIN 7.3 03/09/2023 12:53 PM    TOTPROTEIN 7.9 11/29/2021 09:44 AM    GLOBULIN 3.2 (H) 03/09/2023 12:53 PM    GLOBULIN 3.0 11/29/2021 09:44 AM    AGRATIO 1.3 03/09/2023 12:53 PM    AGRATIO 1.6 11/29/2021 09:44 AM       Lab Results   Component Value Date/Time    CHOLSTRLTOT 165 11/29/2021 0944    TRIGLYCERIDE 36 11/29/2021  0944    HDL 69 2021 0944    LDL 89 2021 0944       Lab Results   Component Value Date/Time    MALBCRT 11 2023 12:30 AM    MICROALBUR 1.5 2023 12:30 AM    MICROALBTIM 50.0 (H) 2022 09:28 AM        ASSESSMENT/PLAN:   1. Type 1 diabetes mellitus with mild nonproliferative retinopathy without macular edema, unspecified laterality (HCC)  Unstable with an A1c of 9.1%  - Exercise for least 150 minutes/week  - Stable hydration  - Daily feet check  - Maintain a healthy diet, minimal carbohydrate, portion control   -Omnipod discussed, risks and benefits-patient reports history only insulin pump covered by her insurance  -Omnipod sent to pharmacy  -She was given sharing code so will able to download her Dexcom at next appointment    Diabetes Medications:   Lantus 25 units at hs--continue  Humalog using a 1:8 cho ratio, correction ratio 1:30 above 130-continue for breakfast, carb ratio decreased to 1: 5 for lunch and dinner  Lyumjev sample given for her to use when she is received for insulin pump  Discussed that insulin pump requires appropriate training that the company will set up for her patient verbalized understanding    Medication Sample: Lyumjev   Strength: 100/units  Quantity: 1  Lot Number: [id number]   Date: 2024      - Insulin Disposable Pump (OMNIPOD 5 G6 INTRO, GEN 5,) Kit; 1 Units every 48 hours.  Dispense: 1 Kit; Refill: 0  - Insulin Disposable Pump (OMNIPOD 5 G6 POD, GEN 5,) Misc; 1 Units every 72 hours.  Dispense: 9 Each; Refill: 11    2. Vitamin D deficiency  Unstable  Encouraged to take 5000 IUs OTC daily vitamin D3    Disposition: Follow-up in 3 months      Thank you kindly for allowing me to participate in the diabetes care plan for this patient.      Mynor Tatum, KAREN.[email or web address].   23        CC:   Kathleen Garay P.A.-C.

## 2023-09-10 DIAGNOSIS — E10.3293 TYPE 1 DIABETES MELLITUS WITH MILD NONPROLIFERATIVE DIABETIC RETINOPATHY WITHOUT MACULAR EDEMA, BILATERAL (HCC): ICD-10-CM

## 2023-09-11 ENCOUNTER — TELEPHONE (OUTPATIENT)
Dept: ENDOCRINOLOGY | Facility: MEDICAL CENTER | Age: 28
End: 2023-09-11
Payer: COMMERCIAL

## 2023-09-11 RX ORDER — INSULIN LISPRO 100 [IU]/ML
INJECTION, SOLUTION INTRAVENOUS; SUBCUTANEOUS
Qty: 30 ML | Refills: 3 | Status: SHIPPED | OUTPATIENT
Start: 2023-09-11 | End: 2023-10-18

## 2023-09-11 NOTE — TELEPHONE ENCOUNTER
HUMALOG KWIKPEN 100 UNIT/ML Solution Pen-injector injection PEN       Recieved request via [facility]    Insurance issue           Pharmacy on File    Sending to Outreach

## 2023-09-13 ENCOUNTER — PATIENT MESSAGE (OUTPATIENT)
Dept: ENDOCRINOLOGY | Facility: MEDICAL CENTER | Age: 28
End: 2023-09-13
Payer: COMMERCIAL

## 2023-09-13 ENCOUNTER — TELEPHONE (OUTPATIENT)
Dept: PHARMACY | Facility: MEDICAL CENTER | Age: 28
End: 2023-09-13
Payer: COMMERCIAL

## 2023-09-13 DIAGNOSIS — E10.3299 TYPE 1 DIABETES MELLITUS WITH MILD NONPROLIFERATIVE RETINOPATHY WITHOUT MACULAR EDEMA, UNSPECIFIED LATERALITY (HCC): ICD-10-CM

## 2023-09-13 NOTE — TELEPHONE ENCOUNTER
Spoke with Patient [phone] offer our [facility] services to fill Humalog Kwik Pen 100UNITS/ML #9ml 28 day supply Copay $25.00 she declined asked to have it send to [facility] - [place], [place] - [address] she fills all her prescriptions there and did not want to switch pharmacies and have them put on hold she is working with MD on getting a pump she said.    Shauna Fry, Pharmacy Liaison/ RX Coordinator

## 2023-09-21 RX ORDER — INSULIN PMP CART,AUT,G6/7,CNTR
1 EACH SUBCUTANEOUS
Qty: 9 EACH | Refills: 11 | Status: SHIPPED | OUTPATIENT
Start: 2023-09-21 | End: 2024-03-20 | Stop reason: SDUPTHER

## 2023-09-21 NOTE — PATIENT COMMUNICATION
Patient requesting refill of Omnipod. Per Omnipod rep, e-prescribed order should be sent to [facility] pharmacies.

## 2023-10-17 ENCOUNTER — OFFICE VISIT (OUTPATIENT)
Dept: ENDOCRINOLOGY | Facility: MEDICAL CENTER | Age: 28
End: 2023-10-17
Payer: COMMERCIAL

## 2023-10-17 VITALS
HEIGHT: 64 IN | HEART RATE: 103 BPM | WEIGHT: 138.1 LBS | SYSTOLIC BLOOD PRESSURE: 108 MMHG | BODY MASS INDEX: 23.58 KG/M2 | DIASTOLIC BLOOD PRESSURE: 60 MMHG | OXYGEN SATURATION: 96 %

## 2023-10-17 DIAGNOSIS — E55.9 VITAMIN D DEFICIENCY: ICD-10-CM

## 2023-10-17 DIAGNOSIS — E10.3293 TYPE 1 DIABETES MELLITUS WITH MILD NONPROLIFERATIVE DIABETIC RETINOPATHY WITHOUT MACULAR EDEMA, BILATERAL (HCC): ICD-10-CM

## 2023-10-17 PROCEDURE — 3078F DIAST BP <80 MM HG: CPT

## 2023-10-17 PROCEDURE — 3074F SYST BP LT 130 MM HG: CPT

## 2023-10-17 PROCEDURE — 99214 OFFICE O/P EST MOD 30 MIN: CPT

## 2023-10-17 PROCEDURE — 99213 OFFICE O/P EST LOW 20 MIN: CPT

## 2023-10-17 ASSESSMENT — FIBROSIS 4 INDEX: FIB4 SCORE: 0.26

## 2023-10-17 NOTE — PROGRESS NOTES
"CHIEF COMPLAINT: Patient is here for follow up of the following conditions    HPI:   Yesi Gloria is a 28 y.o. female      1.  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    POC a1c on 10/17/2023 at 8.6%  POC a1c on 7/7/2023 at 9.1%  Labs from 4/6/2022 HbA1c is 8.0%  POC A1C from 1/26/2022 at 7.7%      Diabetes Medications:   Omnipod and Dexcom - she has had it for the past one moth and half  Humalog -pt was doing better with the Luymjev insulin   Pump downloaded and reviewed with patient              Pt's son got approved for Dexcom so patient was able to get her insurance to approve her Dexcom.   She reports low blood sugars occassionally     Exercise: minimal  Diet: \"healthy\" diet  in general, she works from home     Diabetes Complications   Retinopathy: No known retinopathy.    Last eye exam: 2 years ago      Latest Reference Range & Units 07/07/23 10:21   RETINAL SCREEN Negative, Indeterminate  Negative       Neuropathy: Denies paresthesias or numbness in hands or feet.   Denies any foot wounds.   Latest Reference Range & Units 07/07/23 00:30   Micro Alb Creat Ratio 0 - 30 mg/g 11   Creatinine, Urine mg/dL 133.33   Microalbumin, Urine Random mg/dL 1.5                       2.  Vitamin D deficiency:  Currently taking 1000IUs/day of vit D3        Patient's medications, allergies, and social histories were reviewed and updated as appropriate.    ROS:     CONS:     No fever, no chills   EYES:     No diplopia, no blurry vision   CV:           No chest pain, no palpitations   PULM:     No SOB, no cough, no hemoptysis.   GI:            No nausea, no vomiting, no diarrhea, no constipation   ENDO:     No polyuria, no polydipsia, no heat intolerance, no cold intolerance       Past Medical History:  Problem List:  2022-03: Generalized anxiety disorder  2021-12: Low iron  2021-12: Vitamin D deficiency  2021-02: " "Nausea and vomiting  2021-02: Dehydration  2020-11: Type 1 diabetes mellitus with mild nonproliferative diabetic   retinopathy without macular edema, bilateral ([facility])  2020-03: Major depressive disorder, recurrent episode ([facility])  2019-12: Swelling of both lower extremities  2019-09: Migraine  2017-09: Hydronephrosis  2017-07: Recurrent UTI  2016-03: Exercise-induced bronchospasm  2015-11: Hx of sepsis  2015-11: History of methicillin resistant Staphylococcus aureus   infection  2014-02: Presence of insulin pump      Past Surgical History:  Past Surgical History:   Procedure Laterality Date    BARTHOLIN GLAND EXCISION      PILONIDAL CYST EXCISION      TUBAL COAGULATION LAPAROSCOPIC BILATERAL          Allergies:  Iodine-131 and Iodine     Social History:  Social History     Tobacco Use    Smoking status: Never    Smokeless tobacco: Never   Vaping Use    Vaping Use: Never used   Substance Use Topics    Alcohol use: Yes     Alcohol/week: 1.2 oz     Types: 2 Standard drinks or equivalent per week     Comment: rarely    Drug use: Never        Family History:   family history includes Alcohol abuse in her maternal aunt; Breast Cancer in an other family member; Diabetes in her brother, father, maternal grandmother, mother, and paternal grandfather; Hyperlipidemia in her father, mother, and paternal grandfather; Kidney Disease in her maternal grandmother; Lung Disease in her brother; Obesity in her father and mother.      PHYSICAL EXAM:   OBJECTIVE:  Vital signs: /60 (BP Location: Left arm, Patient Position: Sitting, BP Cuff Size: Adult)   Pulse (!) 103   Ht 1.626 m (5' 4\")   Wt 62.6 kg (138 lb 1.6 oz)   SpO2 96%   BMI 23.70 kg/m²   GENERAL: Well-developed, well-nourished in no apparent distress.   SKIN: No rashes, lesions.     Labs:  Lab Results   Component Value Date/Time    HBA1C 9.1 (A) 07/07/2023 09:55 AM        Lab Results   Component Value Date/Time    WBC 4.9 11/29/2021 09:44 AM    RBC 4.75 03/09/2023 12:53 " PM    RBC 4.84 11/29/2021 09:44 AM    HEMOGLOBIN 13.3 03/09/2023 12:53 PM    HEMOGLOBIN 13.4 11/29/2021 09:44 AM    MCV 83.4 03/09/2023 12:53 PM    MCV 85.7 11/29/2021 09:44 AM    MCH 28.0 03/09/2023 12:53 PM    MCH 27.7 11/29/2021 09:44 AM    MCHC 33.6 03/09/2023 12:53 PM    MCHC 32.3 (L) 11/29/2021 09:44 AM    RDW 12.8 03/09/2023 12:53 PM    RDW 42.4 11/29/2021 09:44 AM    MPV 8.8 03/09/2023 12:53 PM    MPV 10.9 11/29/2021 09:44 AM       Lab Results   Component Value Date/Time    SODIUM 132 (L) 03/09/2023 12:53 PM    SODIUM 138 11/29/2021 09:44 AM    POTASSIUM 3.8 03/09/2023 12:53 PM    POTASSIUM 4.4 11/29/2021 09:44 AM    CHLORIDE 98 (L) 03/09/2023 12:53 PM    CHLORIDE 103 11/29/2021 09:44 AM    CO2 22 03/09/2023 12:53 PM    CO2 24 11/29/2021 09:44 AM    ANION 12 (H) 03/09/2023 12:53 PM    ANION 11.0 11/29/2021 09:44 AM    GLUCOSE 349 (H) 03/09/2023 12:53 PM    GLUCOSE 147 (H) 11/29/2021 09:44 AM    BUN 12 03/09/2023 12:53 PM    BUN 12 11/29/2021 09:44 AM    CREATININE 0.69 03/09/2023 12:53 PM    CREATININE 0.67 11/29/2021 09:44 AM    CALCIUM 8.9 03/09/2023 12:53 PM    CALCIUM 9.3 11/29/2021 09:44 AM    ASTSGOT 11 (L) 03/09/2023 12:53 PM    ASTSGOT 16 11/29/2021 09:44 AM    ALTSGPT 8 (L) 03/09/2023 12:53 PM    ALTSGPT 11 11/29/2021 09:44 AM    TBILIRUBIN 0.6 03/09/2023 12:53 PM    TBILIRUBIN 0.4 11/29/2021 09:44 AM    ALBUMIN 4.1 03/09/2023 12:53 PM    ALBUMIN 4.9 11/29/2021 09:44 AM    TOTPROTEIN 7.3 03/09/2023 12:53 PM    TOTPROTEIN 7.9 11/29/2021 09:44 AM    GLOBULIN 3.2 (H) 03/09/2023 12:53 PM    GLOBULIN 3.0 11/29/2021 09:44 AM    AGRATIO 1.3 03/09/2023 12:53 PM    AGRATIO 1.6 11/29/2021 09:44 AM       Lab Results   Component Value Date/Time    CHOLSTRLTOT 165 11/29/2021 0944    TRIGLYCERIDE 36 11/29/2021 0944    HDL 69 11/29/2021 0944    LDL 89 11/29/2021 0944       Lab Results   Component Value Date/Time    MALBCRT 11 07/07/2023 12:30 AM    MICROALBUR 1.5 07/07/2023 12:30 AM    MICROALBTIM 50.0 (H)  02/18/2022 09:28 AM        ASSESSMENT/PLAN:   1. 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, bilateral (HCC)  Unstable but improving with an a1c at 8.6%  - Exercise for least 150 minutes/week  - Stable hydration  - Daily feet check  - Maintain a healthy diet, minimal carbohydrate, portion control   -Message sent to pharmacy techs to see if I can send for Lyumjev    -changes on her pump as follow:   5PM added with correction at 140, target 110, carb ration at 1:7       - POCT Hemoglobin A1C    2. Vitamin D deficiency  Unstable   Cont regimen -See HPI     Disposition: Follow-up in December 13  Blood work will be ordered at that appointment      Thank you kindly for allowing me to participate in the diabetes care plan for this patient.      KAREN Anthony.P.R.N.   10/17/23          CC:   Kathleen Garay P.A.-C.

## 2023-10-18 DIAGNOSIS — E10.3293 TYPE 1 DIABETES MELLITUS WITH MILD NONPROLIFERATIVE DIABETIC RETINOPATHY WITHOUT MACULAR EDEMA, BILATERAL (HCC): ICD-10-CM

## 2023-10-18 RX ORDER — INSULIN LISPRO-AABC 100 [IU]/ML
INJECTION, SOLUTION INTRAVENOUS; SUBCUTANEOUS
Qty: 60 ML | Refills: 11 | Status: SHIPPED | OUTPATIENT
Start: 2023-10-18

## 2023-11-03 ENCOUNTER — DOCUMENTATION (OUTPATIENT)
Dept: HEALTH INFORMATION MANAGEMENT | Facility: OTHER | Age: 28
End: 2023-11-03
Payer: COMMERCIAL

## 2023-11-22 DIAGNOSIS — E10.65 UNCONTROLLED TYPE 1 DIABETES MELLITUS WITH HYPERGLYCEMIA (HCC): ICD-10-CM

## 2023-11-22 RX ORDER — PROCHLORPERAZINE 25 MG/1
SUPPOSITORY RECTAL
Qty: 9 EACH | Refills: 3 | Status: SHIPPED | OUTPATIENT
Start: 2023-11-22

## 2024-01-24 DIAGNOSIS — E10.65 UNCONTROLLED TYPE 1 DIABETES MELLITUS WITH HYPERGLYCEMIA (HCC): ICD-10-CM

## 2024-01-24 RX ORDER — PROCHLORPERAZINE 25 MG/1
SUPPOSITORY RECTAL
Qty: 1 EACH | Refills: 3 | Status: SHIPPED | OUTPATIENT
Start: 2024-01-24

## 2024-03-20 DIAGNOSIS — E10.3299 TYPE 1 DIABETES MELLITUS WITH MILD NONPROLIFERATIVE RETINOPATHY WITHOUT MACULAR EDEMA, UNSPECIFIED LATERALITY (HCC): ICD-10-CM

## 2024-03-20 RX ORDER — INSULIN PMP CART,AUT,G6/7,CNTR
1 EACH SUBCUTANEOUS
Qty: 9 EACH | Refills: 11 | Status: SHIPPED | OUTPATIENT
Start: 2024-03-20

## 2024-03-24 ENCOUNTER — OFFICE VISIT (OUTPATIENT)
Dept: URGENT CARE | Facility: CLINIC | Age: 29
End: 2024-03-24
Payer: COMMERCIAL

## 2024-03-24 VITALS
DIASTOLIC BLOOD PRESSURE: 74 MMHG | HEIGHT: 64 IN | OXYGEN SATURATION: 99 % | RESPIRATION RATE: 18 BRPM | WEIGHT: 145 LBS | BODY MASS INDEX: 24.75 KG/M2 | SYSTOLIC BLOOD PRESSURE: 116 MMHG | TEMPERATURE: 98.6 F | HEART RATE: 76 BPM

## 2024-03-24 DIAGNOSIS — A46 ERYSIPELAS: ICD-10-CM

## 2024-03-24 DIAGNOSIS — E10.3293 TYPE 1 DIABETES MELLITUS WITH MILD NONPROLIFERATIVE DIABETIC RETINOPATHY WITHOUT MACULAR EDEMA, BILATERAL (HCC): ICD-10-CM

## 2024-03-24 PROCEDURE — 3074F SYST BP LT 130 MM HG: CPT | Performed by: FAMILY MEDICINE

## 2024-03-24 PROCEDURE — 99214 OFFICE O/P EST MOD 30 MIN: CPT | Performed by: FAMILY MEDICINE

## 2024-03-24 PROCEDURE — 3078F DIAST BP <80 MM HG: CPT | Performed by: FAMILY MEDICINE

## 2024-03-24 RX ORDER — CEPHALEXIN 500 MG/1
500 CAPSULE ORAL 3 TIMES DAILY
Qty: 21 CAPSULE | Refills: 0 | Status: SHIPPED | OUTPATIENT
Start: 2024-03-24 | End: 2024-03-31

## 2024-03-24 ASSESSMENT — ENCOUNTER SYMPTOMS: FEVER: 0

## 2024-03-24 ASSESSMENT — FIBROSIS 4 INDEX: FIB4 SCORE: 0.26

## 2024-03-24 NOTE — PROGRESS NOTES
Subjective:     Yesi Gloria is a 28 y.o. female who presents for Otalgia (Patient coming in for both ear possible ear infection due too lexx pricing x 4 days)    HPI  Pt presents for evaluation of an acute problem  Pt recently increased lexx size in her ears   Having pain and redness in the ear lobes   Has a small amount of purulent discharge from the area intermittently   Patient also has history of type 1 diabetes and blood sugars have been fairly well-controlled, but a little bit higher than usual  No other illness, no sore throat or cough    Review of Systems   Constitutional:  Negative for fever.   Skin:  Positive for rash.       PMH:  has a past medical history of Allergy, Anemia, Anxiety, Asthma, Dehydration (2/12/2021), Depression, Diabetes (HCC), Exercise-induced bronchospasm (3/2/2016), IBD (inflammatory bowel disease), Kidney disease, Migraine, Recurrent UTI (7/26/2017), Retinopathy due to secondary DM ([facility]), Swelling of both lower extremities (12/22/2019), and Urinary tract infection.    She has no past medical history of Addisons disease ([facility]), Adrenal disorder ([facility]), Arrhythmia, Arthritis, Blood transfusion without reported diagnosis, Cancer ([facility]), Cataract, CHF (congestive heart failure) ([facility]), Clotting disorder ([facility]), COPD (chronic obstructive pulmonary disease) ([facility]), Cushings syndrome ([facility]), Diabetic neuropathy ([facility]), Glaucoma, Goiter, Head ache, Heart attack ([facility]), Heart murmur, HIV (human immunodeficiency virus infection) ([facility]), Hyperlipidemia, Hypertension, Meningitis, Osteoporosis, Parathyroid disorder ([facility]), Pituitary disease ([facility]), Pulmonary emphysema ([facility]), Seizure ([facility]), Sickle cell disease (HCC), Stroke (HCC), Substance abuse ([facility]), Thyroid disease, or Tuberculosis.  MEDS:   Current Outpatient Medications:     cephALEXin (KEFLEX) 500 MG Cap, Take 1 Capsule by mouth 3 times a day for 7 days., Disp: 21 Capsule, Rfl: 0    Insulin Disposable Pump (OMNIPOD 5 G6 PODS, GEN 5,) Misc, 1 Units  "every 72 hours., Disp: 9 Each, Rfl: 11    Continuous Blood Gluc Transmit (DEXCOM G6 TRANSMITTER) Misc, CHANGE EVERY 3 MONTHS, Disp: 1 Each, Rfl: 3    Continuous Blood Gluc Sensor (DEXCOM G6 SENSOR) Misc, USE 1 SENSOR EVERY 10 DAYS, Disp: 9 Each, Rfl: 3    Insulin Lispro-aabc (LYUMJEV) 100 UNIT/ML Solution, Use up to 60units/day with insulin pump, Disp: 60 mL, Rfl: 11    Insulin Disposable Pump (OMNIPOD 5 G6 INTRO, GEN 5,) Kit, 1 Units every 48 hours., Disp: 1 Kit, Rfl: 0    escitalopram (LEXAPRO) 10 MG Tab, , Disp: , Rfl:     insulin glargine (LANTUS SOLOSTAR) 100 UNIT/ML Solution Pen-injector injection, Inject 25 Units under the skin every evening., Disp: 30 mL, Rfl: 1    ondansetron (ZOFRAN) 8 MG Tab, Take 1 Tablet by mouth every 8 hours as needed for Nausea/Vomiting., Disp: 15 Tablet, Rfl: 0    Insulin Pen Needle 32 G x 4 mm, 1 each 4 times a day,before meals and at bedtime., Disp: 400 Each, Rfl: 3    Continuous Blood Gluc Transmit (DEXCOM G6 TRANSMITTER) Misc, 1 Applicator every 3 months., Disp: 2 Each, Rfl: 2    Continuous Blood Gluc  (DEXCOM G6 ) Device, 1 Applicator continuous., Disp: 9 Each, Rfl: 1  ALLERGIES:   Allergies   Allergen Reactions    Iodine-131 Nausea    Iodine      Vomiting     SURGHX:   Past Surgical History:   Procedure Laterality Date    BARTHOLIN GLAND EXCISION      PILONIDAL CYST EXCISION      TUBAL COAGULATION LAPAROSCOPIC BILATERAL       SOCHX:  reports that she has never smoked. She has never used smokeless tobacco. She reports current alcohol use of about 1.2 oz of alcohol per week. She reports that she does not use drugs.     Objective:   /74   Pulse 76   Temp 37 °C (98.6 °F) (Temporal)   Resp 18   Ht 1.626 m (5' 4\")   Wt 65.8 kg (145 lb)   SpO2 99%   BMI 24.89 kg/m²     Physical Exam  Constitutional:       General: She is not in acute distress.     Appearance: She is well-developed. She is not diaphoretic.   Pulmonary:      Effort: Pulmonary effort is " normal.   Neurological:      Mental Status: She is alert.     Erythema of bilateral earlobes with slight purulence in the anterior aspect of right side.    Assessment/Plan:   Assessment    1. Erysipelas  - cephALEXin (KEFLEX) 500 MG Cap; Take 1 Capsule by mouth 3 times a day for 7 days.  Dispense: 21 Capsule; Refill: 0    2. 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  Recommended treatment with Keflex and reviewed other supportive care measures.  Emphasized importance of making sure that her blood sugars are well-controlled while she is recovering from any infection, as this will impact how quickly she heals.  Follow-up in the urgent care if not making great improvements over the next few days.

## 2024-03-27 ENCOUNTER — NON-PROVIDER VISIT (OUTPATIENT)
Dept: ENDOCRINOLOGY | Facility: MEDICAL CENTER | Age: 29
End: 2024-03-27
Payer: COMMERCIAL

## 2024-03-27 VITALS
OXYGEN SATURATION: 97 % | HEIGHT: 64 IN | DIASTOLIC BLOOD PRESSURE: 62 MMHG | WEIGHT: 147.2 LBS | BODY MASS INDEX: 25.13 KG/M2 | HEART RATE: 96 BPM | RESPIRATION RATE: 16 BRPM | SYSTOLIC BLOOD PRESSURE: 104 MMHG

## 2024-03-27 DIAGNOSIS — E10.3299 TYPE 1 DIABETES MELLITUS WITH MILD NONPROLIFERATIVE RETINOPATHY WITHOUT MACULAR EDEMA, UNSPECIFIED LATERALITY (HCC): ICD-10-CM

## 2024-03-27 PROCEDURE — 99214 OFFICE O/P EST MOD 30 MIN: CPT

## 2024-03-27 ASSESSMENT — FIBROSIS 4 INDEX: FIB4 SCORE: 0.26

## 2024-03-27 NOTE — PROGRESS NOTES
RN-CDE Note    Subjective:   Endocrinology Clinic Progress Note  PCP: Kathleen Garay P.A.-C.    HPI:  Yesi Gloria is a 28 y.o. old patient who is seen today by the Diabetes Nurse Specialist for review of her uncontrolled type 1 diabetes on insulin pump.     Recent changes in health: recent infection on her ear, currently on Keflex  DM:   Last A1c:   Lab Results   Component Value Date/Time    HBA1C 8.1 (A) 01/25/2024 03:46 PM    HBA1C 9.1 (A) 07/07/2023 09:55 AM        A1C GOAL: < 7    Diabetes Medications:   Lyumjev via Omnipod  she has been off the Omnipod for the past 2 weeks (needed a new prescription sent to [facility] and states should be delivered next week).   In the meantime she is taking Lantus 25 units daily and Humalog (utilizing the same carbohydrate ratio and sensitivity on the pump)         Patient's body mass index is unknown because there is no height or weight on file. Exercise and nutrition counseling were performed at this visit.    Glucose monitoring frequency: using Dexcom CGM                  Hypoglycemic episodes: yes -  on occasion  Last Retinal Exam: on file and up-to-date    Lab Results   Component Value Date/Time    MALBCRT 11 07/07/2023 12:30 AM    MICROALBUR 1.5 07/07/2023 12:30 AM    MICROALBTIM 50.0 (H) 02/18/2022 09:28 AM        ACR Albumin/Creatinine Ratio goal <30     HTN:   Blood pressure goal <130/<80 .   Currently Rx ACE/ARB: Not Indicated     Dyslipidemia:    Lab Results   Component Value Date/Time    CHOLSTRLTOT 165 11/29/2021 09:44 AM    LDL 89 11/29/2021 09:44 AM    HDL 69 11/29/2021 09:44 AM    TRIGLYCERIDE 36 11/29/2021 09:44 AM         Currently Rx Statin: Not Indicated     She  reports that she has never smoked. She has never used smokeless tobacco.      Plan:     Discussed and educated on:   - All medications, side effects and compliance (discussed carefully)  - HbA1C: target  - Home glucose monitoring emphasized  - Weight control and daily  exercise    Recommended medication changes: no changes at this time.

## 2024-08-06 ENCOUNTER — APPOINTMENT (RX ONLY)
Dept: URBAN - METROPOLITAN AREA CLINIC 31 | Facility: CLINIC | Age: 29
Setting detail: DERMATOLOGY
End: 2024-08-06

## 2024-08-06 DIAGNOSIS — Z71.89 OTHER SPECIFIED COUNSELING: ICD-10-CM

## 2024-08-06 DIAGNOSIS — S31000A OPEN WOUND(S) (MULTIPLE) OF UNSPECIFIED SITE(S), WITHOUT MENTION OF COMPLICATION: ICD-10-CM

## 2024-08-06 DIAGNOSIS — L82.1 OTHER SEBORRHEIC KERATOSIS: ICD-10-CM

## 2024-08-06 DIAGNOSIS — L81.4 OTHER MELANIN HYPERPIGMENTATION: ICD-10-CM

## 2024-08-06 DIAGNOSIS — D485 NEOPLASM OF UNCERTAIN BEHAVIOR OF SKIN: ICD-10-CM

## 2024-08-06 DIAGNOSIS — D18.0 HEMANGIOMA: ICD-10-CM

## 2024-08-06 PROBLEM — D48.5 NEOPLASM OF UNCERTAIN BEHAVIOR OF SKIN: Status: ACTIVE | Noted: 2024-08-06

## 2024-08-06 PROBLEM — D18.01 HEMANGIOMA OF SKIN AND SUBCUTANEOUS TISSUE: Status: ACTIVE | Noted: 2024-08-06

## 2024-08-06 PROBLEM — S91.001A UNSPECIFIED OPEN WOUND, RIGHT ANKLE, INITIAL ENCOUNTER: Status: ACTIVE | Noted: 2024-08-06

## 2024-08-06 PROCEDURE — ? COUNSELING

## 2024-08-06 PROCEDURE — 99203 OFFICE O/P NEW LOW 30 MIN: CPT | Mod: 25

## 2024-08-06 PROCEDURE — 11102 TANGNTL BX SKIN SINGLE LES: CPT

## 2024-08-06 PROCEDURE — ? PRESCRIPTION

## 2024-08-06 PROCEDURE — ? ADDITIONAL NOTES

## 2024-08-06 PROCEDURE — ? BIOPSY BY SHAVE METHOD

## 2024-08-06 PROCEDURE — ? DIAGNOSIS COMMENT

## 2024-08-06 PROCEDURE — 11103 TANGNTL BX SKIN EA SEP/ADDL: CPT

## 2024-08-06 PROCEDURE — ? MEDICATION COUNSELING

## 2024-08-06 RX ORDER — DOXYCYCLINE HYCLATE 100 MG/1
TABLET, COATED ORAL
Qty: 60 | Refills: 0 | Status: ERX | COMMUNITY
Start: 2024-08-06

## 2024-08-06 RX ORDER — SILVER SULFADIAZINE 10 MG/G
CREAM TOPICAL
Qty: 50 | Refills: 1 | Status: ERX | COMMUNITY
Start: 2024-08-06

## 2024-08-06 RX ORDER — MUPIROCIN 20 MG/G
OINTMENT TOPICAL
Qty: 22 | Refills: 2 | Status: ERX | COMMUNITY
Start: 2024-08-06

## 2024-08-06 RX ADMIN — SILVER SULFADIAZINE: 10 CREAM TOPICAL at 00:00

## 2024-08-06 RX ADMIN — MUPIROCIN: 20 OINTMENT TOPICAL at 00:00

## 2024-08-06 RX ADMIN — DOXYCYCLINE HYCLATE: 100 TABLET, COATED ORAL at 00:00

## 2024-08-06 ASSESSMENT — LOCATION DETAILED DESCRIPTION DERM
LOCATION DETAILED: RIGHT POSTERIOR ANKLE
LOCATION DETAILED: RIGHT ANTERIOR SHOULDER
LOCATION DETAILED: RIGHT ANTERIOR DISTAL THIGH

## 2024-08-06 ASSESSMENT — LOCATION SIMPLE DESCRIPTION DERM
LOCATION SIMPLE: RIGHT SHOULDER
LOCATION SIMPLE: RIGHT THIGH
LOCATION SIMPLE: RIGHT ANKLE

## 2024-08-06 ASSESSMENT — LOCATION ZONE DERM
LOCATION ZONE: LEG
LOCATION ZONE: LEG
LOCATION ZONE: ARM

## 2024-08-06 NOTE — PROCEDURE: MEDICATION COUNSELING
Propranolol Counseling:  I discussed with the patient the risks of propranolol including but not limited to low heart rate, low blood pressure, low blood sugar, restlessness and increased cold sensitivity. They should call the office if they experience any of these side effects.
Valtrex Pregnancy And Lactation Text: this medication is Pregnancy Category B and is considered safe during pregnancy. This medication is not directly found in breast milk but it's metabolite acyclovir is present.
Acitretin Pregnancy And Lactation Text: This medication is Pregnancy Category X and should not be given to women who are pregnant or may become pregnant in the future. This medication is excreted in breast milk.
Tremfya Counseling: I discussed with the patient the risks of guselkumab including but not limited to immunosuppression, serious infections, and drug reactions.  The patient understands that monitoring is required including a PPD at baseline and must alert us or the primary physician if symptoms of infection or other concerning signs are noted.
Vtama Pregnancy And Lactation Text: It is unknown if this medication can cause problems during pregnancy and breastfeeding.
Cantharidin Pregnancy And Lactation Text: This medication has not been proven safe during pregnancy. It is unknown if this medication is excreted in breast milk.
Glycopyrrolate Counseling:  I discussed with the patient the risks of glycopyrrolate including but not limited to skin rash, drowsiness, dry mouth, difficulty urinating, and blurred vision.
Azithromycin Pregnancy And Lactation Text: This medication is considered safe during pregnancy and is also secreted in breast milk.
Nsaids Pregnancy And Lactation Text: These medications are considered safe up to 30 weeks gestation. It is excreted in breast milk.
Cyclosporine Counseling:  I discussed with the patient the risks of cyclosporine including but not limited to hypertension, gingival hyperplasia,myelosuppression, immunosuppression, liver damage, kidney damage, neurotoxicity, lymphoma, and serious infections. The patient understands that monitoring is required including baseline blood pressure, CBC, CMP, lipid panel and uric acid, and then 1-2 times monthly CMP and blood pressure.
Topical Clindamycin Counseling: Patient counseled that this medication may cause skin irritation or allergic reactions.  In the event of skin irritation, the patient was advised to reduce the amount of the drug applied or use it less frequently.   The patient verbalized understanding of the proper use and possible adverse effects of clindamycin.  All of the patient's questions and concerns were addressed.
Rhofade Pregnancy And Lactation Text: This medication has not been assigned a Pregnancy Risk Category. It is unknown if the medication is excreted in breast milk.
Sarecycline Pregnancy And Lactation Text: This medication is Pregnancy Category D and not consider safe during pregnancy. It is also excreted in breast milk.
Litfulo Counseling: I discussed with the patient the risks of Litfulo therapy including but not limited to upper respiratory tract infections, shingles, cold sores, and nausea. Live vaccines should be avoided.  This medication has been linked to serious infections; higher rate of mortality; malignancy and lymphoproliferative disorders; major adverse cardiovascular events; thrombosis; gastrointestinal perforations; neutropenia; lymphopenia; anemia; liver enzyme elevations; and lipid elevations.
Arava Pregnancy And Lactation Text: This medication is Pregnancy Category X and is absolutely contraindicated during pregnancy. It is unknown if it is excreted in breast milk.
Hydroxyzine Counseling: Patient advised that the medication is sedating and not to drive a car after taking this medication.  Patient informed of potential adverse effects including but not limited to dry mouth, urinary retention, and blurry vision.  The patient verbalized understanding of the proper use and possible adverse effects of hydroxyzine.  All of the patient's questions and concerns were addressed.
Metronidazole Counseling:  I discussed with the patient the risks of metronidazole including but not limited to seizures, nausea/vomiting, a metallic taste in the mouth, nausea/vomiting and severe allergy.
Birth Control Pills Pregnancy And Lactation Text: This medication should be avoided if pregnant and for the first 30 days post-partum.
Bimzelx Counseling:  I discussed with the patient the risks of Bimzelx including but not limited to depression, immunosuppression, allergic reactions and infections.  The patient understands that monitoring is required including a PPD at baseline and must alert us or the primary physician if symptoms of infection or other concerning signs are noted.
5-Fu Counseling: 5-Fluorouracil Counseling:  I discussed with the patient the risks of 5-fluorouracil including but not limited to erythema, scaling, itching, weeping, crusting, and pain.
Azelaic Acid Counseling: Patient counseled that medicine may cause skin irritation and to avoid applying near the eyes.  In the event of skin irritation, the patient was advised to reduce the amount of the drug applied or use it less frequently.   The patient verbalized understanding of the proper use and possible adverse effects of azelaic acid.  All of the patient's questions and concerns were addressed.
Infliximab Counseling:  I discussed with the patient the risks of infliximab including but not limited to myelosuppression, immunosuppression, autoimmune hepatitis, demyelinating diseases, lymphoma, and serious infections.  The patient understands that monitoring is required including a PPD at baseline and must alert us or the primary physician if symptoms of infection or other concerning signs are noted.
Opzelura Counseling:  I discussed with the patient the risks of Opzelura including but not limited to nasopharngitis, bronchitis, ear infection, eosinophila, hives, diarrhea, folliculitis, tonsillitis, and rhinorrhea.  Taken orally, this medication has been linked to serious infections; higher rate of mortality; malignancy and lymphoproliferative disorders; major adverse cardiovascular events; thrombosis; thrombocytopenia, anemia, and neutropenia; and lipid elevations.
Litfulo Pregnancy And Lactation Text: Based on animal studies, Lifulo may cause embryo-fetal harm when administered to pregnant women.  The medication should not be used in pregnancy.  Breastfeeding is not recommended during treatment.
Hydroquinone Pregnancy And Lactation Text: This medication has not been assigned a Pregnancy Risk Category but animal studies failed to show danger with the topical medication. It is unknown if the medication is excreted in breast milk.
Topical Steroids Applications Pregnancy And Lactation Text: Most topical steroids are considered safe to use during pregnancy and lactation.  Any topical steroid applied to the breast or nipple should be washed off before breastfeeding.
Ketoconazole Counseling:   Patient counseled regarding improving absorption with orange juice.  Adverse effects include but are not limited to breast enlargement, headache, diarrhea, nausea, upset stomach, liver function test abnormalities, taste disturbance, and stomach pain.  There is a rare possibility of liver failure that can occur when taking ketoconazole. The patient understands that monitoring of LFTs may be required, especially at baseline. The patient verbalized understanding of the proper use and possible adverse effects of ketoconazole.  All of the patient's questions and concerns were addressed.
Metronidazole Pregnancy And Lactation Text: This medication is Pregnancy Category B and considered safe during pregnancy.  It is also excreted in breast milk.
Tetracycline Counseling: Patient counseled regarding possible photosensitivity and increased risk for sunburn.  Patient instructed to avoid sunlight, if possible.  When exposed to sunlight, patients should wear protective clothing, sunglasses, and sunscreen.  The patient was instructed to call the office immediately if the following severe adverse effects occur:  hearing changes, easy bruising/bleeding, severe headache, or vision changes.  The patient verbalized understanding of the proper use and possible adverse effects of tetracycline.  All of the patient's questions and concerns were addressed. Patient understands to avoid pregnancy while on therapy due to potential birth defects.
Olanzapine Counseling- I discussed with the patient the common side effects of olanzapine including but are not limited to: lack of energy, dry mouth, increased appetite, sleepiness, tremor, constipation, dizziness, changes in behavior, or restlessness.  Explained that teenagers are more likely to experience headaches, abdominal pain, pain in the arms or legs, tiredness, and sleepiness.  Serious side effects include but are not limited: increased risk of death in elderly patients who are confused, have memory loss, or dementia-related psychosis; hyperglycemia; increased cholesterol and triglycerides; and weight gain.
Tremfya Pregnancy And Lactation Text: The risk during pregnancy and breastfeeding is uncertain with this medication.
Zoryve Counseling:  I discussed with the patient that Zoryve is not for use in the eyes, mouth or vagina. The most commonly reported side effects include diarrhea, headache, insomnia, application site pain, upper respiratory tract infections, and urinary tract infections.  All of the patient's questions and concerns were addressed.
Propranolol Pregnancy And Lactation Text: This medication is Pregnancy Category C and it isn't known if it is safe during pregnancy. It is excreted in breast milk.
Hydroxyzine Pregnancy And Lactation Text: This medication is not safe during pregnancy and should not be taken. It is also excreted in breast milk and breast feeding isn't recommended.
Bexarotene Counseling:  I discussed with the patient the risks of bexarotene including but not limited to hair loss, dry lips/skin/eyes, liver abnormalities, hyperlipidemia, pancreatitis, depression/suicidal ideation, photosensitivity, drug rash/allergic reactions, hypothyroidism, anemia, leukopenia, infection, cataracts, and teratogenicity.  Patient understands that they will need regular blood tests to check lipid profile, liver function tests, white blood cell count, thyroid function tests and pregnancy test if applicable.
Bactrim Counseling:  I discussed with the patient the risks of sulfa antibiotics including but not limited to GI upset, allergic reaction, drug rash, diarrhea, dizziness, photosensitivity, and yeast infections.  Rarely, more serious reactions can occur including but not limited to aplastic anemia, agranulocytosis, methemoglobinemia, blood dyscrasias, liver or kidney failure, lung infiltrates or desquamative/blistering drug rashes.
Spironolactone Counseling: Patient advised regarding risks of diarrhea, abdominal pain, hyperkalemia, birth defects (for female patients), liver toxicity and renal toxicity. The patient may need blood work to monitor liver and kidney function and potassium levels while on therapy. The patient verbalized understanding of the proper use and possible adverse effects of spironolactone.  All of the patient's questions and concerns were addressed.
Solaraze Counseling:  I discussed with the patient the risks of Solaraze including but not limited to erythema, scaling, itching, weeping, crusting, and pain.
Imiquimod Counseling:  I discussed with the patient the risks of imiquimod including but not limited to erythema, scaling, itching, weeping, crusting, and pain.  Patient understands that the inflammatory response to imiquimod is variable from person to person and was educated regarded proper titration schedule.  If flu-like symptoms develop, patient knows to discontinue the medication and contact us.
Clofazimine Counseling:  I discussed with the patient the risks of clofazimine including but not limited to skin and eye pigmentation, liver damage, nausea/vomiting, gastrointestinal bleeding and allergy.
Opzelura Pregnancy And Lactation Text: There is insufficient data to evaluate drug-associated risk for major birth defects, miscarriage, or other adverse maternal or fetal outcomes.  There is a pregnancy registry that monitors pregnancy outcomes in pregnant persons exposed to the medication during pregnancy.  It is unknown if this medication is excreted in breast milk.  Do not breastfeed during treatment and for about 4 weeks after the last dose.
Use Enhanced Medication Counseling?: No
Topical Clindamycin Pregnancy And Lactation Text: This medication is Pregnancy Category B and is considered safe during pregnancy. It is unknown if it is excreted in breast milk.
Glycopyrrolate Pregnancy And Lactation Text: This medication is Pregnancy Category B and is considered safe during pregnancy. It is unknown if it is excreted breast milk.
Olumiant Counseling: I discussed with the patient the risks of Olumiant therapy including but not limited to upper respiratory tract infections, shingles, cold sores, and nausea. Live vaccines should be avoided.  This medication has been linked to serious infections; higher rate of mortality; malignancy and lymphoproliferative disorders; major adverse cardiovascular events; thrombosis; gastrointestinal perforations; neutropenia; lymphopenia; anemia; liver enzyme elevations; and lipid elevations.
Spevigo Counseling: I discussed with the patient the risks of Spevigo including but not limited to fatigue, nasuea, vomiting, headache, pruritus, urinary tract infection, an infusion related reactions.  The patient understands that monitoring is required including screening for tuberculosis at baseline and yearly screening thereafter while continuing Spevigo therapy. They should contact us if symptoms of infection or other concerning signs are noted.
Simponi Counseling:  I discussed with the patient the risks of golimumab including but not limited to myelosuppression, immunosuppression, autoimmune hepatitis, demyelinating diseases, lymphoma, and serious infections.  The patient understands that monitoring is required including a PPD at baseline and must alert us or the primary physician if symptoms of infection or other concerning signs are noted.
Ketoconazole Pregnancy And Lactation Text: This medication is Pregnancy Category C and it isn't know if it is safe during pregnancy. It is also excreted in breast milk and breast feeding isn't recommended.
Spironolactone Pregnancy And Lactation Text: This medication can cause feminization of the male fetus and should be avoided during pregnancy. The active metabolite is also found in breast milk.
Enbrel Pregnancy And Lactation Text: This medication is Pregnancy Category B and is considered safe during pregnancy. It is unknown if this medication is excreted in breast milk.
Azelaic Acid Pregnancy And Lactation Text: This medication is considered safe during pregnancy and breast feeding.
5-Fu Pregnancy And Lactation Text: This medication is Pregnancy Category X and contraindicated in pregnancy and in women who may become pregnant. It is unknown if this medication is excreted in breast milk.
Bimzelx Pregnancy And Lactation Text: This medication crosses the placenta and the safety is uncertain during pregnancy. It is unknown if this medication is present in breast milk.
Cyclosporine Pregnancy And Lactation Text: This medication is Pregnancy Category C and it isn't know if it is safe during pregnancy. This medication is excreted in breast milk.
Bexarotene Pregnancy And Lactation Text: This medication is Pregnancy Category X and should not be given to women who are pregnant or may become pregnant. This medication should not be used if you are breast feeding.
Topical Sulfur Applications Counseling: Topical Sulfur Counseling: Patient counseled that this medication may cause skin irritation or allergic reactions.  In the event of skin irritation, the patient was advised to reduce the amount of the drug applied or use it less frequently.   The patient verbalized understanding of the proper use and possible adverse effects of topical sulfur application.  All of the patient's questions and concerns were addressed.
Clofazimine Pregnancy And Lactation Text: This medication is Pregnancy Category C and isn't considered safe during pregnancy. It is excreted in breast milk.
Hydroxychloroquine Counseling:  I discussed with the patient that a baseline ophthalmologic exam is needed at the start of therapy and every year thereafter while on therapy. A CBC may also be warranted for monitoring.  The side effects of this medication were discussed with the patient, including but not limited to agranulocytosis, aplastic anemia, seizures, rashes, retinopathy, and liver toxicity. Patient instructed to call the office should any adverse effect occur.  The patient verbalized understanding of the proper use and possible adverse effects of Plaquenil.  All the patient's questions and concerns were addressed.
Bactrim Pregnancy And Lactation Text: This medication is Pregnancy Category D and is known to cause fetal risk.  It is also excreted in breast milk.
Methotrexate Counseling:  Patient counseled regarding adverse effects of methotrexate including but not limited to nausea, vomiting, abnormalities in liver function tests. Patients may develop mouth sores, rash, diarrhea, and abnormalities in blood counts. The patient understands that monitoring is required including LFT's and blood counts.  There is a rare possibility of scarring of the liver and lung problems that can occur when taking methotrexate. Persistent nausea, loss of appetite, pale stools, dark urine, cough, and shortness of breath should be reported immediately. Patient advised to discontinue methotrexate treatment at least three months before attempting to become pregnant.  I discussed the need for folate supplements while taking methotrexate.  These supplements can decrease side effects during methotrexate treatment. The patient verbalized understanding of the proper use and possible adverse effects of methotrexate.  All of the patient's questions and concerns were addressed.
Topical Ketoconazole Counseling: Patient counseled that this medication may cause skin irritation or allergic reactions.  In the event of skin irritation, the patient was advised to reduce the amount of the drug applied or use it less frequently.   The patient verbalized understanding of the proper use and possible adverse effects of ketoconazole.  All of the patient's questions and concerns were addressed.
Minocycline Counseling: Patient advised regarding possible photosensitivity and discoloration of the teeth, skin, lips, tongue and gums.  Patient instructed to avoid sunlight, if possible.  When exposed to sunlight, patients should wear protective clothing, sunglasses, and sunscreen.  The patient was instructed to call the office immediately if the following severe adverse effects occur:  hearing changes, easy bruising/bleeding, severe headache, or vision changes.  The patient verbalized understanding of the proper use and possible adverse effects of minocycline.  All of the patient's questions and concerns were addressed.
Xolair Counseling:  Patient informed of potential adverse effects including but not limited to fever, muscle aches, rash and allergic reactions.  The patient verbalized understanding of the proper use and possible adverse effects of Xolair.  All of the patient's questions and concerns were addressed.
Dutasteride Male Counseling: Dustasteride Counseling:  I discussed with the patient the risks of use of dutasteride including but not limited to decreased libido, decreased ejaculate volume, and gynecomastia. Women who can become pregnant should not handle medication.  All of the patient's questions and concerns were addressed.
Terbinafine Counseling: Patient counseling regarding adverse effects of terbinafine including but not limited to headache, diarrhea, rash, upset stomach, liver function test abnormalities, itching, taste/smell disturbance, nausea, abdominal pain, and flatulence.  There is a rare possibility of liver failure that can occur when taking terbinafine.  The patient understands that a baseline LFT and kidney function test may be required. The patient verbalized understanding of the proper use and possible adverse effects of terbinafine.  All of the patient's questions and concerns were addressed.
Humira Counseling:  I discussed with the patient the risks of adalimumab including but not limited to myelosuppression, immunosuppression, autoimmune hepatitis, demyelinating diseases, lymphoma, and serious infections.  The patient understands that monitoring is required including a PPD at baseline and must alert us or the primary physician if symptoms of infection or other concerning signs are noted.
Imiquimod Pregnancy And Lactation Text: This medication is Pregnancy Category C. It is unknown if this medication is excreted in breast milk.
Olanzapine Pregnancy And Lactation Text: This medication is pregnancy category C.   There are no adequate and well controlled trials with olanzapine in pregnant females.  Olanzapine should be used during pregnancy only if the potential benefit justifies the potential risk to the fetus.   In a study in lactating healthy women, olanzapine was excreted in breast milk.  It is recommended that women taking olanzapine should not breast feed.
SSKI Counseling:  I discussed with the patient the risks of SSKI including but not limited to thyroid abnormalities, metallic taste, GI upset, fever, headache, acne, arthralgias, paraesthesias, lymphadenopathy, easy bleeding, arrhythmias, and allergic reaction.
Drysol Counseling:  I discussed with the patient the risks of drysol/aluminum chloride including but not limited to skin rash, itching, irritation, burning.
Solaraze Pregnancy And Lactation Text: This medication is Pregnancy Category B and is considered safe. There is some data to suggest avoiding during the third trimester. It is unknown if this medication is excreted in breast milk.
Picato Counseling:  I discussed with the patient the risks of Picato including but not limited to erythema, scaling, itching, weeping, crusting, and pain.
Olumiant Pregnancy And Lactation Text: Based on animal studies, Olumiant may cause embryo-fetal harm when administered to pregnant women.  The medication should not be used in pregnancy.  Breastfeeding is not recommended during treatment.
Rituxan Counseling:  I discussed with the patient the risks of Rituxan infusions. Side effects can include infusion reactions, severe drug rashes including mucocutaneous reactions, reactivation of latent hepatitis and other infections and rarely progressive multifocal leukoencephalopathy.  All of the patient's questions and concerns were addressed.
Dutasteride Female Counseling: Dutasteride Counseling:  I discussed with the patient the risks of use of dutasteride including but not limited to decreased libido and sexual dysfunction. Explained the teratogenic nature of the medication and stressed the importance of not getting pregnant during treatment. All of the patient's questions and concerns were addressed.
Cimzia Counseling:  I discussed with the patient the risks of Cimzia including but not limited to immunosuppression, allergic reactions and infections.  The patient understands that monitoring is required including a PPD at baseline and must alert us or the primary physician if symptoms of infection or other concerning signs are noted.
Spevigo Pregnancy And Lactation Text: The risk during pregnancy and breastfeeding is uncertain with this medication. This medication does cross the placenta. It is unknown if this medication is found in breast milk.
Albendazole Counseling:  I discussed with the patient the risks of albendazole including but not limited to cytopenia, kidney damage, nausea/vomiting and severe allergy.  The patient understands that this medication is being used in an off-label manner.
Benzoyl Peroxide Counseling: Patient counseled that medicine may cause skin irritation and bleach clothing.  In the event of skin irritation, the patient was advised to reduce the amount of the drug applied or use it less frequently.   The patient verbalized understanding of the proper use and possible adverse effects of benzoyl peroxide.  All of the patient's questions and concerns were addressed.
Sski Pregnancy And Lactation Text: This medication is Pregnancy Category D and isn't considered safe during pregnancy. It is excreted in breast milk.
Isotretinoin Counseling: Patient should get monthly blood tests, not donate blood, not drive at night if vision affected, not share medication, and not undergo elective surgery for 6 months after tx completed. Side effects reviewed, pt to contact office should one occur.
Xolair Pregnancy And Lactation Text: This medication is Pregnancy Category B and is considered safe during pregnancy. This medication is excreted in breast milk.
Zyclara Counseling:  I discussed with the patient the risks of imiquimod including but not limited to erythema, scaling, itching, weeping, crusting, and pain.  Patient understands that the inflammatory response to imiquimod is variable from person to person and was educated regarded proper titration schedule.  If flu-like symptoms develop, patient knows to discontinue the medication and contact us.
Topical Sulfur Applications Pregnancy And Lactation Text: This medication is considered safe during pregnancy and breast feeding secondary to limited systemic absorption.
Klisyri Counseling:  I discussed with the patient the risks of Klisyri including but not limited to erythema, scaling, itching, weeping, crusting, and pain.
Azathioprine Counseling:  I discussed with the patient the risks of azathioprine including but not limited to myelosuppression, immunosuppression, hepatotoxicity, lymphoma, and infections.  The patient understands that monitoring is required including baseline LFTs, Creatinine, possible TPMP genotyping and weekly CBCs for the first month and then every 2 weeks thereafter.  The patient verbalized understanding of the proper use and possible adverse effects of azathioprine.  All of the patient's questions and concerns were addressed.
Cephalexin Counseling: I counseled the patient regarding use of cephalexin as an antibiotic for prophylactic and/or therapeutic purposes. Cephalexin (commonly prescribed under brand name Keflex) is a cephalosporin antibiotic which is active against numerous classes of bacteria, including most skin bacteria. Side effects may include nausea, diarrhea, gastrointestinal upset, rash, hives, yeast infections, and in rare cases, hepatitis, kidney disease, seizures, fever, confusion, neurologic symptoms, and others. Patients with severe allergies to penicillin medications are cautioned that there is about a 10% incidence of cross-reactivity with cephalosporins. When possible, patients with penicillin allergies should use alternatives to cephalosporins for antibiotic therapy.
Soolantra Counseling: I discussed with the patients the risks of topial Soolantra. This is a medicine which decreases the number of mites and inflammation in the skin. You experience burning, stinging, eye irritation or allergic reactions.  Please call our office if you develop any problems from using this medication.
Colchicine Counseling:  Patient counseled regarding adverse effects including but not limited to stomach upset (nausea, vomiting, stomach pain, or diarrhea).  Patient instructed to limit alcohol consumption while taking this medication.  Colchicine may reduce blood counts especially with prolonged use.  The patient understands that monitoring of kidney function and blood counts may be required, especially at baseline. The patient verbalized understanding of the proper use and possible adverse effects of colchicine.  All of the patient's questions and concerns were addressed.
Fluconazole Counseling:  Patient counseled regarding adverse effects of fluconazole including but not limited to headache, diarrhea, nausea, upset stomach, liver function test abnormalities, taste disturbance, and stomach pain.  There is a rare possibility of liver failure that can occur when taking fluconazole.  The patient understands that monitoring of LFTs and kidney function test may be required, especially at baseline. The patient verbalized understanding of the proper use and possible adverse effects of fluconazole.  All of the patient's questions and concerns were addressed.
Cimzia Pregnancy And Lactation Text: This medication crosses the placenta but can be considered safe in certain situations. Cimzia may be excreted in breast milk.
Hydroxychloroquine Pregnancy And Lactation Text: This medication has been shown to cause fetal harm but it isn't assigned a Pregnancy Risk Category. There are small amounts excreted in breast milk.
Oral Minoxidil Counseling- I discussed with the patient the risks of oral minoxidil including but not limited to shortness of breath, swelling of the feet or ankles, dizziness, lightheadedness, unwanted hair growth and allergic reaction.  The patient verbalized understanding of the proper use and possible adverse effects of oral minoxidil.  All of the patient's questions and concerns were addressed.
Benzoyl Peroxide Pregnancy And Lactation Text: This medication is Pregnancy Category C. It is unknown if benzoyl peroxide is excreted in breast milk.
Methotrexate Pregnancy And Lactation Text: This medication is Pregnancy Category X and is known to cause fetal harm. This medication is excreted in breast milk.
Rituxan Pregnancy And Lactation Text: This medication is Pregnancy Category C and it isn't know if it is safe during pregnancy. It is unknown if this medication is excreted in breast milk but similar antibodies are known to be excreted.
Isotretinoin Pregnancy And Lactation Text: This medication is Pregnancy Category X and is considered extremely dangerous during pregnancy. It is unknown if it is excreted in breast milk.
Wartpeel Counseling:  I discussed with the patient the risks of Wartpeel including but not limited to erythema, scaling, itching, weeping, crusting, and pain.
Low Dose Naltrexone Counseling- I discussed with the patient the potential risks and side effects of low dose naltrexone including but not limited to: more vivid dreams, headaches, nausea, vomiting, abdominal pain, fatigue, dizziness, and anxiety.
Terbinafine Pregnancy And Lactation Text: This medication is Pregnancy Category B and is considered safe during pregnancy. It is also excreted in breast milk and breast feeding isn't recommended.
Oral Minoxidil Pregnancy And Lactation Text: This medication should only be used when clearly needed if you are pregnant, attempting to become pregnant or breast feeding.
Prednisone Counseling:  I discussed with the patient the risks of prolonged use of prednisone including but not limited to weight gain, insomnia, osteoporosis, mood changes, diabetes, susceptibility to infection, glaucoma and high blood pressure.  In cases where prednisone use is prolonged, patients should be monitored with blood pressure checks, serum glucose levels and an eye exam.  Additionally, the patient may need to be placed on GI prophylaxis, PCP prophylaxis, and calcium and vitamin D supplementation and/or a bisphosphonate.  The patient verbalized understanding of the proper use and the possible adverse effects of prednisone.  All of the patient's questions and concerns were addressed.
Topical Metronidazole Counseling: Metronidazole is a topical antibiotic medication. You may experience burning, stinging, redness, or allergic reactions.  Please call our office if you develop any problems from using this medication.
Soolantra Pregnancy And Lactation Text: This medication is Pregnancy Category C. This medication is considered safe during breast feeding.
Rinvoq Counseling: I discussed with the patient the risks of Rinvoq therapy including but not limited to upper respiratory tract infections, shingles, cold sores, bronchitis, nausea, cough, fever, acne, and headache. Live vaccines should be avoided.  This medication has been linked to serious infections; higher rate of mortality; malignancy and lymphoproliferative disorders; major adverse cardiovascular events; thrombosis; thrombocytopenia, anemia, and neutropenia; lipid elevations; liver enzyme elevations; and gastrointestinal perforations.
Stelara Counseling:  I discussed with the patient the risks of ustekinumab including but not limited to immunosuppression, malignancy, posterior leukoencephalopathy syndrome, and serious infections.  The patient understands that monitoring is required including a PPD at baseline and must alert us or the primary physician if symptoms of infection or other concerning signs are noted.
Albendazole Pregnancy And Lactation Text: This medication is Pregnancy Category C and it isn't known if it is safe during pregnancy. It is also excreted in breast milk.
Dutasteride Pregnancy And Lactation Text: This medication is absolutely contraindicated in women, especially during pregnancy and breast feeding. Feminization of male fetuses is possible if taking while pregnant.
Quinolones Counseling:  I discussed with the patient the risks of fluoroquinolones including but not limited to GI upset, allergic reaction, drug rash, diarrhea, dizziness, photosensitivity, yeast infections, liver function test abnormalities, tendonitis/tendon rupture.
Elidel Counseling: Patient may experience a mild burning sensation during topical application. Elidel is not approved in children less than 2 years of age. There have been case reports of hematologic and skin malignancies in patients using topical calcineurin inhibitors although causality is questionable.
Cosentyx Counseling:  I discussed with the patient the risks of Cosentyx including but not limited to worsening of Crohn's disease, immunosuppression, allergic reactions and infections.  The patient understands that monitoring is required including a PPD at baseline and must alert us or the primary physician if symptoms of infection or other concerning signs are noted.
Skyrizi Counseling: I discussed with the patient the risks of risankizumab-rzaa including but not limited to immunosuppression, and serious infections.  The patient understands that monitoring is required including a PPD at baseline and must alert us or the primary physician if symptoms of infection or other concerning signs are noted.
Carac Counseling:  I discussed with the patient the risks of Carac including but not limited to erythema, scaling, itching, weeping, crusting, and pain.
Klisyri Pregnancy And Lactation Text: It is unknown if this medication can harm a developing fetus or if it is excreted in breast milk.
Protopic Counseling: Patient may experience a mild burning sensation during topical application. Protopic is not approved in children less than 2 years of age. There have been case reports of hematologic and skin malignancies in patients using topical calcineurin inhibitors although causality is questionable.
Rinvoq Pregnancy And Lactation Text: Based on animal studies, Rinvoq may cause embryo-fetal harm when administered to pregnant women.  The medication should not be used in pregnancy.  Breastfeeding is not recommended during treatment and for 6 days after the last dose.
Thalidomide Counseling: I discussed with the patient the risks of thalidomide including but not limited to birth defects, anxiety, weakness, chest pain, dizziness, cough and severe allergy.
Cephalexin Pregnancy And Lactation Text: This medication is Pregnancy Category B and considered safe during pregnancy.  It is also excreted in breast milk but can be used safely for shorter doses.
Azathioprine Pregnancy And Lactation Text: This medication is Pregnancy Category D and isn't considered safe during pregnancy. It is unknown if this medication is excreted in breast milk.
Detail Level: Simple
Hyrimoz Counseling:  I discussed with the patient the risks of adalimumab including but not limited to myelosuppression, immunosuppression, autoimmune hepatitis, demyelinating diseases, lymphoma, and serious infections.  The patient understands that monitoring is required including a PPD at baseline and must alert us or the primary physician if symptoms of infection or other concerning signs are noted.
Erivedge Counseling- I discussed with the patient the risks of Erivedge including but not limited to nausea, vomiting, diarrhea, constipation, weight loss, changes in the sense of taste, decreased appetite, muscle spasms, and hair loss.  The patient verbalized understanding of the proper use and possible adverse effects of Erivedge.  All of the patient's questions and concerns were addressed.
Fluconazole Pregnancy And Lactation Text: This medication is Pregnancy Category C and it isn't know if it is safe during pregnancy. It is also excreted in breast milk.
Low Dose Naltrexone Pregnancy And Lactation Text: Naltrexone is pregnancy category C.  There have been no adequate and well-controlled studies in pregnant women.  It should be used in pregnancy only if the potential benefit justifies the potential risk to the fetus.   Limited data indicates that naltrexone is minimally excreted into breastmilk.
Otezla Counseling: The side effects of Otezla were discussed with the patient, including but not limited to worsening or new depression, weight loss, diarrhea, nausea, upper respiratory tract infection, and headache. Patient instructed to call the office should any adverse effect occur.  The patient verbalized understanding of the proper use and possible adverse effects of Otezla.  All the patient's questions and concerns were addressed.
Cellcept Counseling:  I discussed with the patient the risks of mycophenolate mofetil including but not limited to infection/immunosuppression, GI upset, hypokalemia, hypercholesterolemia, bone marrow suppression, lymphoproliferative disorders, malignancy, GI ulceration/bleed/perforation, colitis, interstitial lung disease, kidney failure, progressive multifocal leukoencephalopathy, and birth defects.  The patient understands that monitoring is required including a baseline creatinine and regular CBC testing. In addition, patient must alert us immediately if symptoms of infection or other concerning signs are noted.
Clindamycin Counseling: I counseled the patient regarding use of clindamycin as an antibiotic for prophylactic and/or therapeutic purposes. Clindamycin is active against numerous classes of bacteria, including skin bacteria. Side effects may include nausea, diarrhea, gastrointestinal upset, rash, hives, yeast infections, and in rare cases, colitis.
Siliq Counseling:  I discussed with the patient the risks of Siliq including but not limited to new or worsening depression, suicidal thoughts and behavior, immunosuppression, malignancy, posterior leukoencephalopathy syndrome, and serious infections.  The patient understands that monitoring is required including a PPD at baseline and must alert us or the primary physician if symptoms of infection or other concerning signs are noted. There is also a special program designed to monitor depression which is required with Siliq.
High Dose Vitamin A Counseling: Side effects reviewed, pt to contact office should one occur.
Topical Retinoid counseling:  Patient advised to apply a pea-sized amount only at bedtime and wait 30 minutes after washing their face before applying.  If too drying, patient may add a non-comedogenic moisturizer. The patient verbalized understanding of the proper use and possible adverse effects of retinoids.  All of the patient's questions and concerns were addressed.
Finasteride Male Counseling: Finasteride Counseling:  I discussed with the patient the risks of use of finasteride including but not limited to decreased libido, decreased ejaculate volume, gynecomastia, and depression. Women should not handle medication.  All of the patient's questions and concerns were addressed.
Dapsone Counseling: I discussed with the patient the risks of dapsone including but not limited to hemolytic anemia, agranulocytosis, rashes, methemoglobinemia, kidney failure, peripheral neuropathy, headaches, GI upset, and liver toxicity.  Patients who start dapsone require monitoring including baseline LFTs and weekly CBCs for the first month, then every month thereafter.  The patient verbalized understanding of the proper use and possible adverse effects of dapsone.  All of the patient's questions and concerns were addressed.
Protopic Pregnancy And Lactation Text: This medication is Pregnancy Category C. It is unknown if this medication is excreted in breast milk when applied topically.
Cimetidine Counseling:  I discussed with the patient the risks of Cimetidine including but not limited to gynecomastia, headache, diarrhea, nausea, drowsiness, arrhythmias, pancreatitis, skin rashes, psychosis, bone marrow suppression and kidney toxicity.
Doxycycline Counseling:  Patient counseled regarding possible photosensitivity and increased risk for sunburn.  Patient instructed to avoid sunlight, if possible.  When exposed to sunlight, patients should wear protective clothing, sunglasses, and sunscreen.  The patient was instructed to call the office immediately if the following severe adverse effects occur:  hearing changes, easy bruising/bleeding, severe headache, or vision changes.  The patient verbalized understanding of the proper use and possible adverse effects of doxycycline.  All of the patient's questions and concerns were addressed.
Ivermectin Counseling:  Patient instructed to take medication on an empty stomach with a full glass of water.  Patient informed of potential adverse effects including but not limited to nausea, diarrhea, dizziness, itching, and swelling of the extremities or lymph nodes.  The patient verbalized understanding of the proper use and possible adverse effects of ivermectin.  All of the patient's questions and concerns were addressed.
Sotyktu Counseling:  I discussed the most common side effects of Sotyktu including: common cold, sore throat, sinus infections, cold sores, canker sores, folliculitis, and acne.  I also discussed more serious side effects of Sotyktu including but not limited to: serious allergic reactions; increased risk for infections such as TB; cancers such as lymphomas; rhabdomyolysis and elevated CPK; and elevated triglycerides and liver enzymes. 
Xeljanz Counseling: I discussed with the patient the risks of Xeljanz therapy including increased risk of infection, liver issues, headache, diarrhea, or cold symptoms. Live vaccines should be avoided. They were instructed to call if they have any problems.
Minoxidil Counseling: Minoxidil is a topical medication which can increase blood flow where it is applied. It is uncertain how this medication increases hair growth. Side effects are uncommon and include stinging and allergic reactions.
Opioid Counseling: I discussed with the patient the potential side effects of opioids including but not limited to addiction, altered mental status, and depression. I stressed avoiding alcohol, benzodiazepines, muscle relaxants and sleep aids unless specifically okayed by a physician. The patient verbalized understanding of the proper use and possible adverse effects of opioids. All of the patient's questions and concerns were addressed. They were instructed to flush the remaining pills down the toilet if they did not need them for pain.
Topical Metronidazole Pregnancy And Lactation Text: This medication is Pregnancy Category B and considered safe during pregnancy.  It is also considered safe to use while breastfeeding.
Winlevi Counseling:  I discussed with the patient the risks of topical clascoterone including but not limited to erythema, scaling, itching, and stinging. Patient voiced their understanding.
Griseofulvin Counseling:  I discussed with the patient the risks of griseofulvin including but not limited to photosensitivity, cytopenia, liver damage, nausea/vomiting and severe allergy.  The patient understands that this medication is best absorbed when taken with a fatty meal (e.g., ice cream or french fries).
Rifampin Counseling: I discussed with the patient the risks of rifampin including but not limited to liver damage, kidney damage, red-orange body fluids, nausea/vomiting and severe allergy.
Dapsone Pregnancy And Lactation Text: This medication is Pregnancy Category C and is not considered safe during pregnancy or breast feeding.
Doxycycline Pregnancy And Lactation Text: This medication is Pregnancy Category D and not consider safe during pregnancy. It is also excreted in breast milk but is considered safe for shorter treatment courses.
Niacinamide Counseling: I recommended taking niacin or niacinamide, also know as vitamin B3, twice daily. Recent evidence suggests that taking vitamin B3 (500 mg twice daily) can reduce the risk of actinic keratoses and non-melanoma skin cancers. Side effects of vitamin B3 include flushing and headache.
Clindamycin Pregnancy And Lactation Text: This medication can be used in pregnancy if certain situations. Clindamycin is also present in breast milk.
Taltz Counseling: I discussed with the patient the risks of ixekizumab including but not limited to immunosuppression, serious infections, worsening of inflammatory bowel disease and drug reactions.  The patient understands that monitoring is required including a PPD at baseline and must alert us or the primary physician if symptoms of infection or other concerning signs are noted.
High Dose Vitamin A Pregnancy And Lactation Text: High dose vitamin A therapy is contraindicated during pregnancy and breast feeding.
Qbrexza Counseling:  I discussed with the patient the risks of Qbrexza including but not limited to headache, mydriasis, blurred vision, dry eyes, nasal dryness, dry mouth, dry throat, dry skin, urinary hesitation, and constipation.  Local skin reactions including erythema, burning, stinging, and itching can also occur.
Finasteride Female Counseling: Finasteride Counseling:  I discussed with the patient the risks of use of finasteride including but not limited to decreased libido and sexual dysfunction. Explained the teratogenic nature of the medication and stressed the importance of not getting pregnant during treatment. All of the patient's questions and concerns were addressed.
Libtayo Counseling- I discussed with the patient the risks of Libtayo including but not limited to nausea, vomiting, diarrhea, and bone or muscle pain.  The patient verbalized understanding of the proper use and possible adverse effects of Libtayo.  All of the patient's questions and concerns were addressed.
Eucrisa Counseling: Patient may experience a mild burning sensation during topical application. Eucrisa is not approved in children less than 3 months of age.
Xelkranthiz Pregnancy And Lactation Text: This medication is Pregnancy Category D and is not considered safe during pregnancy.  The risk during breast feeding is also uncertain.
Otezla Pregnancy And Lactation Text: This medication is Pregnancy Category C and it isn't known if it is safe during pregnancy. It is unknown if it is excreted in breast milk.
Tranexamic Acid Counseling:  Patient advised of the small risk of bleeding problems with tranexamic acid. They were also instructed to call if they developed any nausea, vomiting or diarrhea. All of the patient's questions and concerns were addressed.
Cibinqo Counseling: I discussed with the patient the risks of Cibinqo therapy including but not limited to common cold, nausea, headache, cold sores, increased blood CPK levels, dizziness, UTIs, fatigue, acne, and vomitting. Live vaccines should be avoided.  This medication has been linked to serious infections; higher rate of mortality; malignancy and lymphoproliferative disorders; major adverse cardiovascular events; thrombosis; thrombocytopenia and lymphopenia; lipid elevations; and retinal detachment.
Sotyktu Pregnancy And Lactation Text: There is insufficient data to evaluate whether or not Sotyktu is safe to use during pregnancy.   It is not known if Sotyktu passes into breast milk and whether or not it is safe to use when breastfeeding.  
Griseofulvin Pregnancy And Lactation Text: This medication is Pregnancy Category X and is known to cause serious birth defects. It is unknown if this medication is excreted in breast milk but breast feeding should be avoided.
Finasteride Pregnancy And Lactation Text: This medication is absolutely contraindicated during pregnancy. It is unknown if it is excreted in breast milk.
Opioid Pregnancy And Lactation Text: These medications can lead to premature delivery and should be avoided during pregnancy. These medications are also present in breast milk in small amounts.
Dupixent Counseling: I discussed with the patient the risks of dupilumab including but not limited to eye inflammation and irritation, cold sores, injection site reactions, allergic reactions and increased risk of parasitic infection. The patient understands that monitoring is required and they must alert us or the primary physician if symptoms of infection or other concerning signs are noted.
Tranexamic Acid Pregnancy And Lactation Text: It is unknown if this medication is safe during pregnancy or breast feeding.
Calcipotriene Counseling:  I discussed with the patient the risks of calcipotriene including but not limited to erythema, scaling, itching, and irritation.
Winlevi Pregnancy And Lactation Text: This medication is considered safe during pregnancy and breastfeeding.
Erythromycin Counseling:  I discussed with the patient the risks of erythromycin including but not limited to GI upset, allergic reaction, drug rash, diarrhea, increase in liver enzymes, and yeast infections.
Ilumya Counseling: I discussed with the patient the risks of tildrakizumab including but not limited to immunosuppression, malignancy, posterior leukoencephalopathy syndrome, and serious infections.  The patient understands that monitoring is required including a PPD at baseline and must alert us or the primary physician if symptoms of infection or other concerning signs are noted.
Mirvaso Counseling: Mirvaso is a topical medication which can decrease superficial blood flow where applied. Side effects are uncommon and include stinging, redness and allergic reactions.
Cyclophosphamide Counseling:  I discussed with the patient the risks of cyclophosphamide including but not limited to hair loss, hormonal abnormalities, decreased fertility, abdominal pain, diarrhea, nausea and vomiting, bone marrow suppression and infection. The patient understands that monitoring is required while taking this medication.
Tazorac Counseling:  Patient advised that medication is irritating and drying.  Patient may need to apply sparingly and wash off after an hour before eventually leaving it on overnight.  The patient verbalized understanding of the proper use and possible adverse effects of tazorac.  All of the patient's questions and concerns were addressed.
Doxepin Counseling:  Patient advised that the medication is sedating and not to drive a car after taking this medication. Patient informed of potential adverse effects including but not limited to dry mouth, urinary retention, and blurry vision.  The patient verbalized understanding of the proper use and possible adverse effects of doxepin.  All of the patient's questions and concerns were addressed.
Libtayo Pregnancy And Lactation Text: This medication is contraindicated in pregnancy and when breast feeding.
Rifampin Pregnancy And Lactation Text: This medication is Pregnancy Category C and it isn't know if it is safe during pregnancy. It is also excreted in breast milk and should not be used if you are breast feeding.
Itraconazole Counseling:  I discussed with the patient the risks of itraconazole including but not limited to liver damage, nausea/vomiting, neuropathy, and severe allergy.  The patient understands that this medication is best absorbed when taken with acidic beverages such as non-diet cola or ginger ale.  The patient understands that monitoring is required including baseline LFTs and repeat LFTs at intervals.  The patient understands that they are to contact us or the primary physician if concerning signs are noted.
Dupixent Pregnancy And Lactation Text: This medication likely crosses the placenta but the risk for the fetus is uncertain. This medication is excreted in breast milk.
Gabapentin Counseling: I discussed with the patient the risks of gabapentin including but not limited to dizziness, somnolence, fatigue and ataxia.
Niacinamide Pregnancy And Lactation Text: These medications are considered safe during pregnancy.
Aklief counseling:  Patient advised to apply a pea-sized amount only at bedtime and wait 30 minutes after washing their face before applying.  If too drying, patient may add a non-comedogenic moisturizer.  The most commonly reported side effects including irritation, redness, scaling, dryness, stinging, burning, itching, and increased risk of sunburn.  The patient verbalized understanding of the proper use and possible adverse effects of retinoids.  All of the patient's questions and concerns were addressed.
Oxybutynin Counseling:  I discussed with the patient the risks of oxybutynin including but not limited to skin rash, drowsiness, dry mouth, difficulty urinating, and blurred vision.
Adbry Counseling: I discussed with the patient the risks of tralokinumab including but not limited to eye infection and irritation, cold sores, injection site reactions, worsening of asthma, allergic reactions and increased risk of parasitic infection.  Live vaccines should be avoided while taking tralokinumab. The patient understands that monitoring is required and they must alert us or the primary physician if symptoms of infection or other concerning signs are noted.
Calcipotriene Pregnancy And Lactation Text: The use of this medication during pregnancy or lactation is not recommended as there is insufficient data.
Qbrexza Pregnancy And Lactation Text: There is no available data on Qbrexza use in pregnant women.  There is no available data on Qbrexza use in lactation.
Nsaids Counseling: NSAID Counseling: I discussed with the patient that NSAIDs should be taken with food. Prolonged use of NSAIDs can result in the development of stomach ulcers.  Patient advised to stop taking NSAIDs if abdominal pain occurs.  The patient verbalized understanding of the proper use and possible adverse effects of NSAIDs.  All of the patient's questions and concerns were addressed.
Acitretin Counseling:  I discussed with the patient the risks of acitretin including but not limited to hair loss, dry lips/skin/eyes, liver damage, hyperlipidemia, depression/suicidal ideation, photosensitivity.  Serious rare side effects can include but are not limited to pancreatitis, pseudotumor cerebri, bony changes, clot formation/stroke/heart attack.  Patient understands that alcohol is contraindicated since it can result in liver toxicity and significantly prolong the elimination of the drug by many years.
Cibinqo Pregnancy And Lactation Text: It is unknown if this medication will adversely affect pregnancy or breast feeding.  You should not take this medication if you are currently pregnant or planning a pregnancy or while breastfeeding.
Doxepin Pregnancy And Lactation Text: This medication is Pregnancy Category C and it isn't known if it is safe during pregnancy. It is also excreted in breast milk and breast feeding isn't recommended.
Sarecycline Counseling: Patient advised regarding possible photosensitivity and discoloration of the teeth, skin, lips, tongue and gums.  Patient instructed to avoid sunlight, if possible.  When exposed to sunlight, patients should wear protective clothing, sunglasses, and sunscreen.  The patient was instructed to call the office immediately if the following severe adverse effects occur:  hearing changes, easy bruising/bleeding, severe headache, or vision changes.  The patient verbalized understanding of the proper use and possible adverse effects of sarecycline.  All of the patient's questions and concerns were addressed.
VTAMA Counseling: I discussed with the patient that VTAMA is not for use in the eyes, mouth or mouth. They should call the office if they develop any signs of allergic reactions to VTAMA. The patient verbalized understanding of the proper use and possible adverse effects of VTAMA.  All of the patient's questions and concerns were addressed.
Birth Control Pills Counseling: Birth Control Pill Counseling: I discussed with the patient the potential side effects of OCPs including but not limited to increased risk of stroke, heart attack, thrombophlebitis, deep venous thrombosis, hepatic adenomas, breast changes, GI upset, headaches, and depression.  The patient verbalized understanding of the proper use and possible adverse effects of OCPs. All of the patient's questions and concerns were addressed.
Cantharidin Counseling:  I discussed with the patient the risks of Cantharidin including but not limited to pain, redness, burning, itching, and blistering.
Odomzo Counseling- I discussed with the patient the risks of Odomzo including but not limited to nausea, vomiting, diarrhea, constipation, weight loss, changes in the sense of taste, decreased appetite, muscle spasms, and hair loss.  The patient verbalized understanding of the proper use and possible adverse effects of Odomzo.  All of the patient's questions and concerns were addressed.
Enbrel Counseling:  I discussed with the patient the risks of etanercept including but not limited to myelosuppression, immunosuppression, autoimmune hepatitis, demyelinating diseases, lymphoma, and infections.  The patient understands that monitoring is required including a PPD at baseline and must alert us or the primary physician if symptoms of infection or other concerning signs are noted.
Hydroquinone Counseling:  Patient advised that medication may result in skin irritation, lightening (hypopigmentation), dryness, and burning.  In the event of skin irritation, the patient was advised to reduce the amount of the drug applied or use it less frequently.  Rarely, spots that are treated with hydroquinone can become darker (pseudoochronosis).  Should this occur, patient instructed to stop medication and call the office. The patient verbalized understanding of the proper use and possible adverse effects of hydroquinone.  All of the patient's questions and concerns were addressed.
Tazorac Pregnancy And Lactation Text: This medication is not safe during pregnancy. It is unknown if this medication is excreted in breast milk.
Rhofade Counseling: Rhofade is a topical medication which can decrease superficial blood flow where applied. Side effects are uncommon and include stinging, redness and allergic reactions.
Erythromycin Pregnancy And Lactation Text: This medication is Pregnancy Category B and is considered safe during pregnancy. It is also excreted in breast milk.
Arava Counseling:  Patient counseled regarding adverse effects of Arava including but not limited to nausea, vomiting, abnormalities in liver function tests. Patients may develop mouth sores, rash, diarrhea, and abnormalities in blood counts. The patient understands that monitoring is required including LFTs and blood counts.  There is a rare possibility of scarring of the liver and lung problems that can occur when taking methotrexate. Persistent nausea, loss of appetite, pale stools, dark urine, cough, and shortness of breath should be reported immediately. Patient advised to discontinue Arava treatment and consult with a physician prior to attempting conception. The patient will have to undergo a treatment to eliminate Arava from the body prior to conception.
Valtrex Counseling: I discussed with the patient the risks of valacyclovir including but not limited to kidney damage, nausea, vomiting and severe allergy.  The patient understands that if the infection seems to be worsening or is not improving, they are to call.
Topical Steroids Counseling: I discussed with the patient that prolonged use of topical steroids can result in the increased appearance of superficial blood vessels (telangiectasias), lightening (hypopigmentation) and thinning of the skin (atrophy).  Patient understands to avoid using high potency steroids in skin folds, the groin or the face.  The patient verbalized understanding of the proper use and possible adverse effects of topical steroids.  All of the patient's questions and concerns were addressed.
Adbry Pregnancy And Lactation Text: It is unknown if this medication will adversely affect pregnancy or breast feeding.
Azithromycin Counseling:  I discussed with the patient the risks of azithromycin including but not limited to GI upset, allergic reaction, drug rash, diarrhea, and yeast infections.
Aklief Pregnancy And Lactation Text: It is unknown if this medication is safe to use during pregnancy.  It is unknown if this medication is excreted in breast milk.  Breastfeeding women should use the topical cream on the smallest area of the skin for the shortest time needed while breastfeeding.  Do not apply to nipple and areola.
Cyclophosphamide Pregnancy And Lactation Text: This medication is Pregnancy Category D and it isn't considered safe during pregnancy. This medication is excreted in breast milk.

## 2024-08-06 NOTE — PROCEDURE: DIAGNOSIS COMMENT
Render Risk Assessment In Note?: no
Comment: 8,6,24 no sweats, chills or fever, no warmth to touch, no discharge.
Patient Management Risk Assessment: Moderate
Detail Level: Simple

## 2024-08-06 NOTE — PROCEDURE: ADDITIONAL NOTES
Patient Management Risk Assessment: Moderate
Render Risk Assessment In Note?: no
Detail Level: Simple
Additional Notes: Discussed antibiotics for infection prevention due to patient type 1 diabetes

## 2024-09-04 ENCOUNTER — APPOINTMENT (RX ONLY)
Dept: URBAN - METROPOLITAN AREA CLINIC 31 | Facility: CLINIC | Age: 29
Setting detail: DERMATOLOGY
End: 2024-09-04

## 2024-09-04 DIAGNOSIS — Z87.2 PERSONAL HISTORY OF DISEASES OF THE SKIN AND SUBCUTANEOUS TISSUE: ICD-10-CM

## 2024-09-04 DIAGNOSIS — S31000A OPEN WOUND(S) (MULTIPLE) OF UNSPECIFIED SITE(S), WITHOUT MENTION OF COMPLICATION: ICD-10-CM | Status: IMPROVED

## 2024-09-04 DIAGNOSIS — Z71.89 OTHER SPECIFIED COUNSELING: ICD-10-CM

## 2024-09-04 PROBLEM — S91.001A UNSPECIFIED OPEN WOUND, RIGHT ANKLE, INITIAL ENCOUNTER: Status: ACTIVE | Noted: 2024-09-04

## 2024-09-04 PROCEDURE — 99213 OFFICE O/P EST LOW 20 MIN: CPT

## 2024-09-04 PROCEDURE — ? COUNSELING

## 2024-09-04 ASSESSMENT — LOCATION SIMPLE DESCRIPTION DERM
LOCATION SIMPLE: RIGHT ANKLE
LOCATION SIMPLE: RIGHT SHOULDER

## 2024-09-04 ASSESSMENT — LOCATION DETAILED DESCRIPTION DERM
LOCATION DETAILED: RIGHT ANTERIOR SHOULDER
LOCATION DETAILED: RIGHT POSTERIOR ANKLE

## 2024-09-04 ASSESSMENT — LOCATION ZONE DERM
LOCATION ZONE: ARM
LOCATION ZONE: LEG

## 2024-09-11 ENCOUNTER — TELEPHONE (OUTPATIENT)
Dept: ENDOCRINOLOGY | Facility: MEDICAL CENTER | Age: 29
End: 2024-09-11
Payer: COMMERCIAL

## 2024-09-11 DIAGNOSIS — E10.3299 TYPE 1 DIABETES MELLITUS WITH MILD NONPROLIFERATIVE RETINOPATHY WITHOUT MACULAR EDEMA, UNSPECIFIED LATERALITY (HCC): ICD-10-CM

## 2024-09-11 RX ORDER — PROCHLORPERAZINE 25 MG/1
1 SUPPOSITORY RECTAL
Qty: 9 EACH | Refills: 3 | Status: SHIPPED | OUTPATIENT
Start: 2024-09-11

## 2024-09-11 RX ORDER — PROCHLORPERAZINE 25 MG/1
1 SUPPOSITORY RECTAL
Qty: 2 EACH | Refills: 2 | Status: SHIPPED | OUTPATIENT
Start: 2024-09-11

## 2024-09-11 NOTE — TELEPHONE ENCOUNTER
Patient is requesting for a curtesy refill on her Dexcom sensors while she waits to see Vik Christianson. Please send to [facility] on file      Thank you!

## 2024-10-08 ENCOUNTER — OFFICE VISIT (OUTPATIENT)
Dept: ENDOCRINOLOGY | Facility: MEDICAL CENTER | Age: 29
End: 2024-10-08
Payer: COMMERCIAL

## 2024-10-08 VITALS
OXYGEN SATURATION: 97 % | HEART RATE: 80 BPM | HEIGHT: 64 IN | DIASTOLIC BLOOD PRESSURE: 64 MMHG | WEIGHT: 139 LBS | BODY MASS INDEX: 23.73 KG/M2 | SYSTOLIC BLOOD PRESSURE: 112 MMHG

## 2024-10-08 DIAGNOSIS — Z79.4 LONG TERM (CURRENT) USE OF INSULIN (HCC): ICD-10-CM

## 2024-10-08 DIAGNOSIS — E55.9 VITAMIN D DEFICIENCY: ICD-10-CM

## 2024-10-08 DIAGNOSIS — E10.3299 TYPE 1 DIABETES MELLITUS WITH MILD NONPROLIFERATIVE RETINOPATHY WITHOUT MACULAR EDEMA, UNSPECIFIED LATERALITY (HCC): ICD-10-CM

## 2024-10-08 DIAGNOSIS — Z96.41 PRESENCE OF INSULIN PUMP: ICD-10-CM

## 2024-10-08 LAB
HBA1C MFR BLD: 7.8 % (ref ?–5.8)
POCT INT CON NEG: NEGATIVE
POCT INT CON POS: POSITIVE

## 2024-10-08 PROCEDURE — 95249 CONT GLUC MNTR PT PROV EQP: CPT

## 2024-10-08 PROCEDURE — 3078F DIAST BP <80 MM HG: CPT

## 2024-10-08 PROCEDURE — 3074F SYST BP LT 130 MM HG: CPT

## 2024-10-08 PROCEDURE — 99214 OFFICE O/P EST MOD 30 MIN: CPT

## 2024-10-08 PROCEDURE — 99212 OFFICE O/P EST SF 10 MIN: CPT

## 2024-10-08 PROCEDURE — 92250 FUNDUS PHOTOGRAPHY W/I&R: CPT

## 2024-10-08 PROCEDURE — 95251 CONT GLUC MNTR ANALYSIS I&R: CPT

## 2024-10-08 PROCEDURE — 83036 HEMOGLOBIN GLYCOSYLATED A1C: CPT

## 2024-10-08 RX ORDER — FLUOXETINE 40 MG/1
40 CAPSULE ORAL DAILY
COMMUNITY

## 2024-10-08 ASSESSMENT — FIBROSIS 4 INDEX: FIB4 SCORE: 0.32

## 2024-10-15 LAB — RETINAL SCREEN: NEGATIVE

## 2024-12-18 ENCOUNTER — HOSPITAL ENCOUNTER (OUTPATIENT)
Dept: RADIOLOGY | Facility: MEDICAL CENTER | Age: 29
End: 2024-12-18
Payer: COMMERCIAL

## 2024-12-18 DIAGNOSIS — R11.0 NAUSEA: ICD-10-CM

## 2024-12-18 DIAGNOSIS — R68.81 EARLY SATIETY: ICD-10-CM

## 2024-12-18 PROCEDURE — A9541 TC99M SULFUR COLLOID: HCPCS

## 2025-01-08 ENCOUNTER — TELEPHONE (OUTPATIENT)
Dept: ENDOCRINOLOGY | Facility: MEDICAL CENTER | Age: 30
End: 2025-01-08
Payer: COMMERCIAL

## 2025-01-10 DIAGNOSIS — E10.3293 TYPE 1 DIABETES MELLITUS WITH MILD NONPROLIFERATIVE DIABETIC RETINOPATHY WITHOUT MACULAR EDEMA, BILATERAL (HCC): ICD-10-CM

## 2025-01-10 RX ORDER — INSULIN LISPRO-AABC 100 [IU]/ML
INJECTION, SOLUTION INTRAVENOUS; SUBCUTANEOUS
Qty: 60 ML | Refills: 11 | Status: SHIPPED | OUTPATIENT
Start: 2025-01-10

## 2025-01-10 NOTE — TELEPHONE ENCOUNTER
Received request via: Pharmacy    Was the patient seen in the last year in this department? Yes    Does the patient have an active prescription (recently filled or refills available) for medication(s) requested? No    Pharmacy Name: [facility]    Does the patient have care home Plus and need 100-day supply? (This applies to ALL medications) Patient does not have SCP

## 2025-01-13 NOTE — PROGRESS NOTES
"RN-CDE Note    Subjective:   Endocrinology Clinic Progress Note  PCP: Kathleen Garay P.A.-C.    HPI:  Yesi Gloria is a 29 y.o. old patient who is seen today by the Diabetes Nurse Specialist for review of***   Recent changes in health: ***  DM:   Last A1c:   Lab Results   Component Value Date/Time    HBA1C 7.8 (A) 10/08/2024 09:23 AM      Previous A1c was *** on ***  {A1C GOAL:66968::\"< 7\"}    Diabetes Medications:   ***         Exercise: {EXERCISE:30301}  Diet: {DIET HABITS:21355}  Patient's body mass index is unknown because there is no height or weight on file. Exercise and nutrition counseling were performed at this visit.    Glucose monitoring frequency:  using Dexcom     Hypoglycemic episodes: {YES***/NO:60}     Last Retinal Exam: {LAST RETINAL EXAM:21357}  Daily Foot Exam: {YES (DEF)/NO:85269}   Foot Exam:  Monofilament: {DONE:77069}  Lab Results   Component Value Date/Time    MALBCRT 11 07/07/2023 12:30 AM    MICROALBUR 1.5 07/07/2023 12:30 AM    MICROALBTIM 50.0 (H) 02/18/2022 09:28 AM        ACR Albumin/Creatinine Ratio goal <30     HTN:   Blood pressure goal <130/<80   Currently Rx ACE/ARB:  ***    Dyslipidemia:    Lab Results   Component Value Date/Time    CHOLSTRLTOT 165 11/29/2021 09:44 AM    LDL 89 11/29/2021 09:44 AM    HDL 69 11/29/2021 09:44 AM    TRIGLYCERIDE 36 11/29/2021 09:44 AM         Currently Rx Statin:  ***    She  reports that she has never smoked. She has never used smokeless tobacco.    Plan:     Discussed and educated on:   {DIABETES RECOMMENDATIONS:96366}    Recommended medication changes: ***     "

## 2025-01-14 ENCOUNTER — APPOINTMENT (OUTPATIENT)
Dept: ENDOCRINOLOGY | Facility: MEDICAL CENTER | Age: 30
End: 2025-01-14
Payer: COMMERCIAL

## 2025-05-13 DIAGNOSIS — E10.3299 TYPE 1 DIABETES MELLITUS WITH MILD NONPROLIFERATIVE RETINOPATHY WITHOUT MACULAR EDEMA, UNSPECIFIED LATERALITY (HCC): ICD-10-CM

## 2025-05-13 RX ORDER — INSULIN PMP CART,AUT,G6/7,CNTR
1 EACH SUBCUTANEOUS
Qty: 9 EACH | Refills: 11 | Status: SHIPPED | OUTPATIENT
Start: 2025-05-13 | End: 2025-05-28 | Stop reason: SDUPTHER

## 2025-05-13 NOTE — TELEPHONE ENCOUNTER
Medication Requested: Omnipod 5 pods       Insulin pen Or vial? : N/A        Days Supply: 9        Pharmacy: [facility]        Number of Refills:

## 2025-05-28 DIAGNOSIS — E10.3299 TYPE 1 DIABETES MELLITUS WITH MILD NONPROLIFERATIVE RETINOPATHY WITHOUT MACULAR EDEMA, UNSPECIFIED LATERALITY (HCC): ICD-10-CM

## 2025-05-28 RX ORDER — INSULIN PMP CART,AUT,G6/7,CNTR
1 EACH SUBCUTANEOUS
Qty: 9 EACH | Refills: 11 | Status: SHIPPED | OUTPATIENT
Start: 2025-05-28

## 2025-05-28 NOTE — TELEPHONE ENCOUNTER
Received request via: Pharmacy via fax - pt confirmed    Was the patient seen in the last year in this department? Yes    Does the patient have an active prescription (recently filled or refills available) for medication(s) requested? No    Pharmacy Name: [facility]    Does the patient have [facility] and need 100-day supply? (This applies to ALL medications) Patient does not have SCP

## 2025-05-28 NOTE — TELEPHONE ENCOUNTER
Incoming fax request from [facility] for a refill on Omnipod 5 dexcom pods.    LVM for patient to call us back to confirm where it needs to go to as she has a standing approved rx wo [facility]